# Patient Record
Sex: FEMALE | Race: BLACK OR AFRICAN AMERICAN | NOT HISPANIC OR LATINO | Employment: FULL TIME | URBAN - METROPOLITAN AREA
[De-identification: names, ages, dates, MRNs, and addresses within clinical notes are randomized per-mention and may not be internally consistent; named-entity substitution may affect disease eponyms.]

---

## 2024-04-20 ENCOUNTER — APPOINTMENT (OUTPATIENT)
Dept: RADIOLOGY | Facility: HOSPITAL | Age: 59
End: 2024-04-20
Payer: COMMERCIAL

## 2024-04-20 ENCOUNTER — APPOINTMENT (OUTPATIENT)
Dept: CARDIOLOGY | Facility: HOSPITAL | Age: 59
End: 2024-04-20
Payer: COMMERCIAL

## 2024-04-20 ENCOUNTER — HOSPITAL ENCOUNTER (OUTPATIENT)
Facility: HOSPITAL | Age: 59
Setting detail: OBSERVATION
Discharge: HOME | End: 2024-04-23
Attending: STUDENT IN AN ORGANIZED HEALTH CARE EDUCATION/TRAINING PROGRAM | Admitting: INTERNAL MEDICINE
Payer: COMMERCIAL

## 2024-04-20 DIAGNOSIS — G45.9 TIA (TRANSIENT ISCHEMIC ATTACK): ICD-10-CM

## 2024-04-20 DIAGNOSIS — R55 SYNCOPE, UNSPECIFIED SYNCOPE TYPE: ICD-10-CM

## 2024-04-20 DIAGNOSIS — I63.9 CEREBROVASCULAR ACCIDENT (CVA), UNSPECIFIED MECHANISM (MULTI): ICD-10-CM

## 2024-04-20 DIAGNOSIS — G45.8 OTHER TRANSIENT CEREBRAL ISCHEMIC ATTACKS AND RELATED SYNDROMES: ICD-10-CM

## 2024-04-20 DIAGNOSIS — R53.1 GENERALIZED WEAKNESS: Primary | ICD-10-CM

## 2024-04-20 DIAGNOSIS — N30.00 ACUTE CYSTITIS WITHOUT HEMATURIA: ICD-10-CM

## 2024-04-20 LAB
ALBUMIN SERPL BCP-MCNC: 4 G/DL (ref 3.4–5)
ALP SERPL-CCNC: 58 U/L (ref 33–110)
ALT SERPL W P-5'-P-CCNC: 14 U/L (ref 7–45)
ANION GAP SERPL CALC-SCNC: 14 MMOL/L (ref 10–20)
APPEARANCE UR: ABNORMAL
AST SERPL W P-5'-P-CCNC: 15 U/L (ref 9–39)
BACTERIA #/AREA URNS AUTO: ABNORMAL /HPF
BASOPHILS # BLD AUTO: 0.08 X10*3/UL (ref 0–0.1)
BASOPHILS NFR BLD AUTO: 0.7 %
BILIRUB SERPL-MCNC: 0.3 MG/DL (ref 0–1.2)
BILIRUB UR STRIP.AUTO-MCNC: NEGATIVE MG/DL
BNP SERPL-MCNC: 15 PG/ML (ref 0–99)
BUN SERPL-MCNC: 13 MG/DL (ref 6–23)
CALCIUM SERPL-MCNC: 9.1 MG/DL (ref 8.6–10.3)
CARDIAC TROPONIN I PNL SERPL HS: 3 NG/L (ref 0–13)
CARDIAC TROPONIN I PNL SERPL HS: 3 NG/L (ref 0–13)
CHLORIDE SERPL-SCNC: 103 MMOL/L (ref 98–107)
CK SERPL-CCNC: 473 U/L (ref 0–215)
CO2 SERPL-SCNC: 26 MMOL/L (ref 21–32)
COLOR UR: YELLOW
CREAT SERPL-MCNC: 0.71 MG/DL (ref 0.5–1.05)
EGFRCR SERPLBLD CKD-EPI 2021: >90 ML/MIN/1.73M*2
EOSINOPHIL # BLD AUTO: 0.31 X10*3/UL (ref 0–0.7)
EOSINOPHIL NFR BLD AUTO: 2.6 %
ERYTHROCYTE [DISTWIDTH] IN BLOOD BY AUTOMATED COUNT: 13.6 % (ref 11.5–14.5)
FLUAV RNA RESP QL NAA+PROBE: NOT DETECTED
FLUBV RNA RESP QL NAA+PROBE: NOT DETECTED
GLUCOSE BLD MANUAL STRIP-MCNC: 142 MG/DL (ref 74–99)
GLUCOSE BLD MANUAL STRIP-MCNC: 98 MG/DL (ref 74–99)
GLUCOSE SERPL-MCNC: 151 MG/DL (ref 74–99)
GLUCOSE UR STRIP.AUTO-MCNC: ABNORMAL MG/DL
HCT VFR BLD AUTO: 39.5 % (ref 36–46)
HGB BLD-MCNC: 12.3 G/DL (ref 12–16)
IMM GRANULOCYTES # BLD AUTO: 0.03 X10*3/UL (ref 0–0.7)
IMM GRANULOCYTES NFR BLD AUTO: 0.3 % (ref 0–0.9)
KETONES UR STRIP.AUTO-MCNC: NEGATIVE MG/DL
LACTATE SERPL-SCNC: 1.9 MMOL/L (ref 0.4–2)
LEUKOCYTE ESTERASE UR QL STRIP.AUTO: ABNORMAL
LIPASE SERPL-CCNC: 37 U/L (ref 9–82)
LYMPHOCYTES # BLD AUTO: 5.98 X10*3/UL (ref 1.2–4.8)
LYMPHOCYTES NFR BLD AUTO: 50.4 %
MAGNESIUM SERPL-MCNC: 1.84 MG/DL (ref 1.6–2.4)
MCH RBC QN AUTO: 27.4 PG (ref 26–34)
MCHC RBC AUTO-ENTMCNC: 31.1 G/DL (ref 32–36)
MCV RBC AUTO: 88 FL (ref 80–100)
MONOCYTES # BLD AUTO: 0.75 X10*3/UL (ref 0.1–1)
MONOCYTES NFR BLD AUTO: 6.3 %
MUCOUS THREADS #/AREA URNS AUTO: ABNORMAL /LPF
NEUTROPHILS # BLD AUTO: 4.71 X10*3/UL (ref 1.2–7.7)
NEUTROPHILS NFR BLD AUTO: 39.7 %
NITRITE UR QL STRIP.AUTO: POSITIVE
NRBC BLD-RTO: 0 /100 WBCS (ref 0–0)
PH UR STRIP.AUTO: 5 [PH]
PLATELET # BLD AUTO: 286 X10*3/UL (ref 150–450)
POTASSIUM SERPL-SCNC: 3.5 MMOL/L (ref 3.5–5.3)
PROT SERPL-MCNC: 8 G/DL (ref 6.4–8.2)
PROT UR STRIP.AUTO-MCNC: NEGATIVE MG/DL
RBC # BLD AUTO: 4.49 X10*6/UL (ref 4–5.2)
RBC # UR STRIP.AUTO: ABNORMAL /UL
RBC #/AREA URNS AUTO: ABNORMAL /HPF
RSV RNA RESP QL NAA+PROBE: NOT DETECTED
SARS-COV-2 RNA RESP QL NAA+PROBE: NOT DETECTED
SODIUM SERPL-SCNC: 139 MMOL/L (ref 136–145)
SP GR UR STRIP.AUTO: 1.02
SQUAMOUS #/AREA URNS AUTO: ABNORMAL /HPF
UROBILINOGEN UR STRIP.AUTO-MCNC: <2 MG/DL
WBC # BLD AUTO: 11.9 X10*3/UL (ref 4.4–11.3)
WBC #/AREA URNS AUTO: ABNORMAL /HPF

## 2024-04-20 PROCEDURE — 99285 EMERGENCY DEPT VISIT HI MDM: CPT | Mod: 25

## 2024-04-20 PROCEDURE — G0378 HOSPITAL OBSERVATION PER HR: HCPCS

## 2024-04-20 PROCEDURE — 82947 ASSAY GLUCOSE BLOOD QUANT: CPT | Mod: 59,91

## 2024-04-20 PROCEDURE — 83036 HEMOGLOBIN GLYCOSYLATED A1C: CPT | Performed by: NURSE PRACTITIONER

## 2024-04-20 PROCEDURE — 74177 CT ABD & PELVIS W/CONTRAST: CPT | Performed by: RADIOLOGY

## 2024-04-20 PROCEDURE — 83690 ASSAY OF LIPASE: CPT | Performed by: STUDENT IN AN ORGANIZED HEALTH CARE EDUCATION/TRAINING PROGRAM

## 2024-04-20 PROCEDURE — 36415 COLL VENOUS BLD VENIPUNCTURE: CPT | Performed by: STUDENT IN AN ORGANIZED HEALTH CARE EDUCATION/TRAINING PROGRAM

## 2024-04-20 PROCEDURE — 94660 CPAP INITIATION&MGMT: CPT

## 2024-04-20 PROCEDURE — 83880 ASSAY OF NATRIURETIC PEPTIDE: CPT | Performed by: STUDENT IN AN ORGANIZED HEALTH CARE EDUCATION/TRAINING PROGRAM

## 2024-04-20 PROCEDURE — 96375 TX/PRO/DX INJ NEW DRUG ADDON: CPT

## 2024-04-20 PROCEDURE — 99222 1ST HOSP IP/OBS MODERATE 55: CPT | Performed by: NURSE PRACTITIONER

## 2024-04-20 PROCEDURE — 71045 X-RAY EXAM CHEST 1 VIEW: CPT

## 2024-04-20 PROCEDURE — 87086 URINE CULTURE/COLONY COUNT: CPT | Mod: PARLAB | Performed by: STUDENT IN AN ORGANIZED HEALTH CARE EDUCATION/TRAINING PROGRAM

## 2024-04-20 PROCEDURE — 87637 SARSCOV2&INF A&B&RSV AMP PRB: CPT | Performed by: STUDENT IN AN ORGANIZED HEALTH CARE EDUCATION/TRAINING PROGRAM

## 2024-04-20 PROCEDURE — 71045 X-RAY EXAM CHEST 1 VIEW: CPT | Performed by: RADIOLOGY

## 2024-04-20 PROCEDURE — 74177 CT ABD & PELVIS W/CONTRAST: CPT

## 2024-04-20 PROCEDURE — 82947 ASSAY GLUCOSE BLOOD QUANT: CPT | Mod: 59

## 2024-04-20 PROCEDURE — 83605 ASSAY OF LACTIC ACID: CPT | Performed by: STUDENT IN AN ORGANIZED HEALTH CARE EDUCATION/TRAINING PROGRAM

## 2024-04-20 PROCEDURE — 83735 ASSAY OF MAGNESIUM: CPT | Performed by: STUDENT IN AN ORGANIZED HEALTH CARE EDUCATION/TRAINING PROGRAM

## 2024-04-20 PROCEDURE — 96372 THER/PROPH/DIAG INJ SC/IM: CPT | Performed by: NURSE PRACTITIONER

## 2024-04-20 PROCEDURE — 80053 COMPREHEN METABOLIC PANEL: CPT | Performed by: STUDENT IN AN ORGANIZED HEALTH CARE EDUCATION/TRAINING PROGRAM

## 2024-04-20 PROCEDURE — 85025 COMPLETE CBC W/AUTO DIFF WBC: CPT | Performed by: STUDENT IN AN ORGANIZED HEALTH CARE EDUCATION/TRAINING PROGRAM

## 2024-04-20 PROCEDURE — 2500000004 HC RX 250 GENERAL PHARMACY W/ HCPCS (ALT 636 FOR OP/ED): Performed by: NURSE PRACTITIONER

## 2024-04-20 PROCEDURE — 81001 URINALYSIS AUTO W/SCOPE: CPT | Performed by: STUDENT IN AN ORGANIZED HEALTH CARE EDUCATION/TRAINING PROGRAM

## 2024-04-20 PROCEDURE — 70450 CT HEAD/BRAIN W/O DYE: CPT | Performed by: RADIOLOGY

## 2024-04-20 PROCEDURE — 84484 ASSAY OF TROPONIN QUANT: CPT | Performed by: STUDENT IN AN ORGANIZED HEALTH CARE EDUCATION/TRAINING PROGRAM

## 2024-04-20 PROCEDURE — 96361 HYDRATE IV INFUSION ADD-ON: CPT

## 2024-04-20 PROCEDURE — 93005 ELECTROCARDIOGRAM TRACING: CPT

## 2024-04-20 PROCEDURE — 70450 CT HEAD/BRAIN W/O DYE: CPT

## 2024-04-20 PROCEDURE — 2500000006 HC RX 250 W HCPCS SELF ADMINISTERED DRUGS (ALT 637 FOR ALL PAYERS): Mod: MUE | Performed by: NURSE PRACTITIONER

## 2024-04-20 PROCEDURE — 2500000006 HC RX 250 W HCPCS SELF ADMINISTERED DRUGS (ALT 637 FOR ALL PAYERS): Performed by: NURSE PRACTITIONER

## 2024-04-20 PROCEDURE — 82550 ASSAY OF CK (CPK): CPT | Performed by: STUDENT IN AN ORGANIZED HEALTH CARE EDUCATION/TRAINING PROGRAM

## 2024-04-20 PROCEDURE — 2500000004 HC RX 250 GENERAL PHARMACY W/ HCPCS (ALT 636 FOR OP/ED): Performed by: STUDENT IN AN ORGANIZED HEALTH CARE EDUCATION/TRAINING PROGRAM

## 2024-04-20 PROCEDURE — 2550000001 HC RX 255 CONTRASTS: Performed by: STUDENT IN AN ORGANIZED HEALTH CARE EDUCATION/TRAINING PROGRAM

## 2024-04-20 RX ORDER — ASPIRIN 81 MG/1
81 TABLET ORAL DAILY
Status: DISCONTINUED | OUTPATIENT
Start: 2024-04-21 | End: 2024-04-23 | Stop reason: HOSPADM

## 2024-04-20 RX ORDER — ENOXAPARIN SODIUM 100 MG/ML
40 INJECTION SUBCUTANEOUS EVERY 12 HOURS SCHEDULED
Status: DISCONTINUED | OUTPATIENT
Start: 2024-04-20 | End: 2024-04-23 | Stop reason: HOSPADM

## 2024-04-20 RX ORDER — PROMETHAZINE HYDROCHLORIDE 25 MG/1
25 TABLET ORAL DAILY PRN
COMMUNITY
Start: 2022-08-31

## 2024-04-20 RX ORDER — MELOXICAM 7.5 MG/1
7.5 TABLET ORAL EVERY 6 HOURS PRN
Status: DISCONTINUED | OUTPATIENT
Start: 2024-04-20 | End: 2024-04-23 | Stop reason: HOSPADM

## 2024-04-20 RX ORDER — LIDOCAINE 40 MG/G
1 CREAM TOPICAL 3 TIMES DAILY PRN
Status: DISCONTINUED | OUTPATIENT
Start: 2024-04-20 | End: 2024-04-23 | Stop reason: HOSPADM

## 2024-04-20 RX ORDER — DICLOFENAC SODIUM 10 MG/G
1 GEL TOPICAL 4 TIMES DAILY PRN
COMMUNITY
Start: 2023-04-07

## 2024-04-20 RX ORDER — DIPHENHYDRAMINE HCL 25 MG
25 TABLET ORAL NIGHTLY PRN
COMMUNITY
Start: 2024-03-25

## 2024-04-20 RX ORDER — DEXTROSE 50 % IN WATER (D50W) INTRAVENOUS SYRINGE
25
Status: DISCONTINUED | OUTPATIENT
Start: 2024-04-20 | End: 2024-04-23 | Stop reason: HOSPADM

## 2024-04-20 RX ORDER — INSULIN GLARGINE 300 [IU]/ML
25 INJECTION, SOLUTION SUBCUTANEOUS EVERY EVENING
COMMUNITY
Start: 2023-12-12

## 2024-04-20 RX ORDER — FLUTICASONE FUROATE AND VILANTEROL 200; 25 UG/1; UG/1
1 POWDER RESPIRATORY (INHALATION) EVERY OTHER DAY
Status: DISCONTINUED | OUTPATIENT
Start: 2024-04-21 | End: 2024-04-23 | Stop reason: HOSPADM

## 2024-04-20 RX ORDER — ACETAMINOPHEN 325 MG/1
975 TABLET ORAL EVERY 8 HOURS
Status: DISCONTINUED | OUTPATIENT
Start: 2024-04-20 | End: 2024-04-23 | Stop reason: HOSPADM

## 2024-04-20 RX ORDER — ROSUVASTATIN CALCIUM 10 MG/1
20 TABLET, COATED ORAL NIGHTLY
Status: DISCONTINUED | OUTPATIENT
Start: 2024-04-20 | End: 2024-04-23 | Stop reason: HOSPADM

## 2024-04-20 RX ORDER — POLYETHYLENE GLYCOL 3350 17 G/17G
17 POWDER, FOR SOLUTION ORAL 2 TIMES DAILY PRN
Status: DISCONTINUED | OUTPATIENT
Start: 2024-04-20 | End: 2024-04-23 | Stop reason: HOSPADM

## 2024-04-20 RX ORDER — METOPROLOL SUCCINATE 50 MG/1
50 TABLET, EXTENDED RELEASE ORAL DAILY
COMMUNITY
Start: 2022-08-31

## 2024-04-20 RX ORDER — LABETALOL HYDROCHLORIDE 5 MG/ML
10 INJECTION, SOLUTION INTRAVENOUS EVERY 10 MIN PRN
Status: ACTIVE | OUTPATIENT
Start: 2024-04-20 | End: 2024-04-22

## 2024-04-20 RX ORDER — HYDRALAZINE HYDROCHLORIDE 25 MG/1
25 TABLET, FILM COATED ORAL EVERY 6 HOURS PRN
Status: DISCONTINUED | OUTPATIENT
Start: 2024-04-22 | End: 2024-04-23 | Stop reason: HOSPADM

## 2024-04-20 RX ORDER — INSULIN GLARGINE 100 [IU]/ML
20 INJECTION, SOLUTION SUBCUTANEOUS EVERY 24 HOURS
Status: DISCONTINUED | OUTPATIENT
Start: 2024-04-20 | End: 2024-04-23 | Stop reason: HOSPADM

## 2024-04-20 RX ORDER — MELOXICAM 7.5 MG/1
7.5 TABLET ORAL EVERY 6 HOURS PRN
COMMUNITY
Start: 2022-08-31

## 2024-04-20 RX ORDER — ALBUTEROL SULFATE 90 UG/1
2 AEROSOL, METERED RESPIRATORY (INHALATION) EVERY 4 HOURS PRN
COMMUNITY
Start: 2024-01-29

## 2024-04-20 RX ORDER — LORAZEPAM 2 MG/ML
2 INJECTION INTRAMUSCULAR ONCE AS NEEDED
Status: DISCONTINUED | OUTPATIENT
Start: 2024-04-20 | End: 2024-04-23 | Stop reason: HOSPADM

## 2024-04-20 RX ORDER — LIDOCAINE 40 MG/G
1 CREAM TOPICAL DAILY PRN
COMMUNITY

## 2024-04-20 RX ORDER — ASPIRIN 325 MG
50000 TABLET, DELAYED RELEASE (ENTERIC COATED) ORAL
COMMUNITY
Start: 2023-07-19

## 2024-04-20 RX ORDER — DEXTROSE 50 % IN WATER (D50W) INTRAVENOUS SYRINGE
12.5
Status: DISCONTINUED | OUTPATIENT
Start: 2024-04-20 | End: 2024-04-23 | Stop reason: HOSPADM

## 2024-04-20 RX ORDER — DEXTROMETHORPHAN HYDROBROMIDE, GUAIFENESIN 5; 100 MG/5ML; MG/5ML
1300 LIQUID ORAL EVERY 8 HOURS PRN
COMMUNITY
Start: 2024-03-27

## 2024-04-20 RX ORDER — CYCLOBENZAPRINE HCL 10 MG
10 TABLET ORAL EVERY 12 HOURS PRN
Status: DISCONTINUED | OUTPATIENT
Start: 2024-04-20 | End: 2024-04-23 | Stop reason: HOSPADM

## 2024-04-20 RX ORDER — FLUTICASONE FUROATE AND VILANTEROL 200; 25 UG/1; UG/1
1 POWDER RESPIRATORY (INHALATION) EVERY OTHER DAY
COMMUNITY
Start: 2023-11-28

## 2024-04-20 RX ORDER — ROSUVASTATIN CALCIUM 20 MG/1
20 TABLET, COATED ORAL NIGHTLY
COMMUNITY
Start: 2024-03-25

## 2024-04-20 RX ORDER — DICLOFENAC SODIUM 10 MG/G
4 GEL TOPICAL 4 TIMES DAILY PRN
Status: DISCONTINUED | OUTPATIENT
Start: 2024-04-20 | End: 2024-04-23 | Stop reason: HOSPADM

## 2024-04-20 RX ORDER — LISINOPRIL 10 MG/1
40 TABLET ORAL DAILY
Status: CANCELLED | OUTPATIENT
Start: 2024-04-20

## 2024-04-20 RX ORDER — ALBUTEROL SULFATE 0.83 MG/ML
2.5 SOLUTION RESPIRATORY (INHALATION) EVERY 4 HOURS PRN
Status: DISCONTINUED | OUTPATIENT
Start: 2024-04-20 | End: 2024-04-21

## 2024-04-20 RX ORDER — HYDRALAZINE HYDROCHLORIDE 20 MG/ML
10 INJECTION INTRAMUSCULAR; INTRAVENOUS
Status: ACTIVE | OUTPATIENT
Start: 2024-04-20 | End: 2024-04-22

## 2024-04-20 RX ORDER — MORPHINE SULFATE 4 MG/ML
4 INJECTION, SOLUTION INTRAMUSCULAR; INTRAVENOUS ONCE
Status: COMPLETED | OUTPATIENT
Start: 2024-04-20 | End: 2024-04-20

## 2024-04-20 RX ORDER — TRIAMTERENE AND HYDROCHLOROTHIAZIDE 37.5; 25 MG/1; MG/1
1 CAPSULE ORAL EVERY MORNING
COMMUNITY
Start: 2023-07-25 | End: 2024-04-23 | Stop reason: HOSPADM

## 2024-04-20 RX ORDER — METOPROLOL SUCCINATE 50 MG/1
50 TABLET, EXTENDED RELEASE ORAL DAILY
Status: DISCONTINUED | OUTPATIENT
Start: 2024-04-21 | End: 2024-04-23 | Stop reason: HOSPADM

## 2024-04-20 RX ORDER — TRIAMTERENE/HYDROCHLOROTHIAZID 37.5-25 MG
1 TABLET ORAL DAILY
Status: CANCELLED | OUTPATIENT
Start: 2024-04-20

## 2024-04-20 RX ORDER — ALPHA LIPOIC ACID 100 MG
100 CAPSULE ORAL DAILY
COMMUNITY

## 2024-04-20 RX ORDER — LISINOPRIL 40 MG/1
40 TABLET ORAL DAILY
Status: DISCONTINUED | OUTPATIENT
Start: 2024-04-21 | End: 2024-04-23 | Stop reason: HOSPADM

## 2024-04-20 RX ORDER — SEMAGLUTIDE 1.34 MG/ML
1 INJECTION, SOLUTION SUBCUTANEOUS
COMMUNITY
Start: 2023-12-01

## 2024-04-20 RX ORDER — DIPHENHYDRAMINE HCL 25 MG
25 CAPSULE ORAL NIGHTLY PRN
Status: DISCONTINUED | OUTPATIENT
Start: 2024-04-20 | End: 2024-04-23 | Stop reason: HOSPADM

## 2024-04-20 RX ORDER — TRIAMTERENE/HYDROCHLOROTHIAZID 37.5-25 MG
1 TABLET ORAL DAILY
Status: DISCONTINUED | OUTPATIENT
Start: 2024-04-21 | End: 2024-04-23 | Stop reason: HOSPADM

## 2024-04-20 RX ORDER — INSULIN LISPRO 100 [IU]/ML
0-10 INJECTION, SOLUTION INTRAVENOUS; SUBCUTANEOUS
Status: DISCONTINUED | OUTPATIENT
Start: 2024-04-21 | End: 2024-04-23 | Stop reason: HOSPADM

## 2024-04-20 RX ORDER — CYCLOBENZAPRINE HCL 10 MG
10 TABLET ORAL EVERY 12 HOURS PRN
COMMUNITY
Start: 2023-10-20

## 2024-04-20 RX ORDER — LISINOPRIL 40 MG/1
40 TABLET ORAL DAILY
COMMUNITY
Start: 2023-09-12 | End: 2024-09-11

## 2024-04-20 RX ADMIN — MORPHINE SULFATE 4 MG: 4 INJECTION, SOLUTION INTRAMUSCULAR; INTRAVENOUS at 18:10

## 2024-04-20 RX ADMIN — ACETAMINOPHEN 975 MG: 325 TABLET ORAL at 22:29

## 2024-04-20 RX ADMIN — ROSUVASTATIN CALCIUM 20 MG: 10 TABLET, FILM COATED ORAL at 22:29

## 2024-04-20 RX ADMIN — IOHEXOL 90 ML: 350 INJECTION, SOLUTION INTRAVENOUS at 21:06

## 2024-04-20 RX ADMIN — SODIUM CHLORIDE, POTASSIUM CHLORIDE, SODIUM LACTATE AND CALCIUM CHLORIDE 1000 ML: 600; 310; 30; 20 INJECTION, SOLUTION INTRAVENOUS at 16:58

## 2024-04-20 RX ADMIN — ENOXAPARIN SODIUM 40 MG: 40 INJECTION SUBCUTANEOUS at 22:29

## 2024-04-20 SDOH — ECONOMIC STABILITY: HOUSING INSECURITY: DO YOU FEEL UNSAFE GOING BACK TO THE PLACE WHERE YOU LIVE?: NO

## 2024-04-20 SDOH — SOCIAL STABILITY: SOCIAL INSECURITY: HAVE YOU HAD ANY THOUGHTS OF HARMING ANYONE ELSE?: NO

## 2024-04-20 SDOH — SOCIAL STABILITY: SOCIAL INSECURITY: HAVE YOU HAD THOUGHTS OF HARMING ANYONE ELSE?: NO

## 2024-04-20 SDOH — SOCIAL STABILITY: SOCIAL INSECURITY: HAS ANYONE EVER THREATENED TO HURT YOUR FAMILY OR YOUR PETS?: NO

## 2024-04-20 SDOH — SOCIAL STABILITY: SOCIAL INSECURITY: DOES ANYONE TRY TO KEEP YOU FROM HAVING/CONTACTING OTHER FRIENDS OR DOING THINGS OUTSIDE YOUR HOME?: NO

## 2024-04-20 SDOH — SOCIAL STABILITY: SOCIAL INSECURITY: DO YOU FEEL UNSAFE GOING BACK TO THE PLACE WHERE YOU ARE LIVING?: NO

## 2024-04-20 SDOH — SOCIAL STABILITY: SOCIAL INSECURITY: ARE THERE ANY APPARENT SIGNS OF INJURIES/BEHAVIORS THAT COULD BE RELATED TO ABUSE/NEGLECT?: NO

## 2024-04-20 SDOH — SOCIAL STABILITY: SOCIAL INSECURITY: DO YOU FEEL ANYONE HAS EXPLOITED OR TAKEN ADVANTAGE OF YOU FINANCIALLY OR OF YOUR PERSONAL PROPERTY?: NO

## 2024-04-20 SDOH — SOCIAL STABILITY: SOCIAL INSECURITY: ARE YOU OR HAVE YOU BEEN THREATENED OR ABUSED PHYSICALLY, EMOTIONALLY, OR SEXUALLY BY ANYONE?: NO

## 2024-04-20 SDOH — SOCIAL STABILITY: SOCIAL INSECURITY: ABUSE: ADULT

## 2024-04-20 ASSESSMENT — PAIN SCALES - GENERAL
PAINLEVEL_OUTOF10: 5 - MODERATE PAIN
PAINLEVEL_OUTOF10: 5 - MODERATE PAIN
PAINLEVEL_OUTOF10: 0 - NO PAIN
PAINLEVEL_OUTOF10: 7
PAINLEVEL_OUTOF10: 5 - MODERATE PAIN

## 2024-04-20 ASSESSMENT — COGNITIVE AND FUNCTIONAL STATUS - GENERAL
DAILY ACTIVITIY SCORE: 24
WALKING IN HOSPITAL ROOM: TOTAL
STANDING UP FROM CHAIR USING ARMS: A LOT
CLIMB 3 TO 5 STEPS WITH RAILING: TOTAL
DAILY ACTIVITIY SCORE: 24
MOBILITY SCORE: 14
MOBILITY SCORE: 14
MOVING TO AND FROM BED TO CHAIR: A LOT
WALKING IN HOSPITAL ROOM: TOTAL
PATIENT BASELINE BEDBOUND: UNABLE TO ASSESS AT THIS TIME
STANDING UP FROM CHAIR USING ARMS: A LOT
CLIMB 3 TO 5 STEPS WITH RAILING: TOTAL
MOVING TO AND FROM BED TO CHAIR: A LOT

## 2024-04-20 ASSESSMENT — LIFESTYLE VARIABLES
TOTAL SCORE: 0
SKIP TO QUESTIONS 9-10: 1
AUDIT-C TOTAL SCORE: 0
EVER HAD A DRINK FIRST THING IN THE MORNING TO STEADY YOUR NERVES TO GET RID OF A HANGOVER: NO
EVER FELT BAD OR GUILTY ABOUT YOUR DRINKING: NO
SUBSTANCE_ABUSE_PAST_12_MONTHS: NO
HOW OFTEN DO YOU HAVE 6 OR MORE DRINKS ON ONE OCCASION: NEVER
HAVE YOU EVER FELT YOU SHOULD CUT DOWN ON YOUR DRINKING: NO
AUDIT-C TOTAL SCORE: 0
HOW MANY STANDARD DRINKS CONTAINING ALCOHOL DO YOU HAVE ON A TYPICAL DAY: PATIENT DOES NOT DRINK
HOW OFTEN DO YOU HAVE A DRINK CONTAINING ALCOHOL: NEVER
HAVE PEOPLE ANNOYED YOU BY CRITICIZING YOUR DRINKING: NO

## 2024-04-20 ASSESSMENT — PAIN DESCRIPTION - PROGRESSION
CLINICAL_PROGRESSION: GRADUALLY WORSENING
CLINICAL_PROGRESSION: GRADUALLY IMPROVING
CLINICAL_PROGRESSION: NOT CHANGED

## 2024-04-20 ASSESSMENT — ACTIVITIES OF DAILY LIVING (ADL)
WALKS IN HOME: DEPENDENT
GROOMING: INDEPENDENT
ASSISTIVE_DEVICE: WHEELCHAIR
HEARING - RIGHT EAR: FUNCTIONAL
LACK_OF_TRANSPORTATION: PATIENT DECLINED
TOILETING: NEEDS ASSISTANCE
JUDGMENT_ADEQUATE_SAFELY_COMPLETE_DAILY_ACTIVITIES: YES
PATIENT'S MEMORY ADEQUATE TO SAFELY COMPLETE DAILY ACTIVITIES?: YES
BATHING: INDEPENDENT
DRESSING YOURSELF: INDEPENDENT
HEARING - LEFT EAR: FUNCTIONAL
FEEDING YOURSELF: INDEPENDENT
ADEQUATE_TO_COMPLETE_ADL: YES

## 2024-04-20 ASSESSMENT — PAIN DESCRIPTION - LOCATION
LOCATION: GENERALIZED

## 2024-04-20 ASSESSMENT — PATIENT HEALTH QUESTIONNAIRE - PHQ9
1. LITTLE INTEREST OR PLEASURE IN DOING THINGS: NOT AT ALL
SUM OF ALL RESPONSES TO PHQ9 QUESTIONS 1 & 2: 0
2. FEELING DOWN, DEPRESSED OR HOPELESS: NOT AT ALL

## 2024-04-20 ASSESSMENT — COLUMBIA-SUICIDE SEVERITY RATING SCALE - C-SSRS
1. SINCE LAST CONTACT, HAVE YOU WISHED YOU WERE DEAD OR WISHED YOU COULD GO TO SLEEP AND NOT WAKE UP?: NO
6. HAVE YOU EVER DONE ANYTHING, STARTED TO DO ANYTHING, OR PREPARED TO DO ANYTHING TO END YOUR LIFE?: NO
2. HAVE YOU ACTUALLY HAD ANY THOUGHTS OF KILLING YOURSELF?: NO

## 2024-04-20 ASSESSMENT — PAIN DESCRIPTION - PAIN TYPE
TYPE: CHRONIC PAIN

## 2024-04-20 ASSESSMENT — PAIN - FUNCTIONAL ASSESSMENT
PAIN_FUNCTIONAL_ASSESSMENT: 0-10
PAIN_FUNCTIONAL_ASSESSMENT: 0-10

## 2024-04-20 NOTE — ED PROVIDER NOTES
HPI   No chief complaint on file.      HPI     Patient is a 59-year-old female presenting to the emergency department today in the setting of generalized weakness.  This happened while she was seated making shoes for her grandchildren at her daughter's house.  She states that she was doing this and then was overcome by a sudden bout of weakness.  Reportedly she did have a bowel movement at that time.  She did not lose tone or fall out of her chair or hit her head.  No recent trauma.  She states she is otherwise been at her baseline state of health.  She has generalized pain at baseline given a history of fibromyalgia which is unchanged per patient.  She also has polymyositis and states she struggles with pain and some generalized weakness in the setting of this history.  Denies any known sick contacts.  No nausea, vomiting.  No new pain and specifically denies any chest pain pressure-like chest discomfort.  Denies any headache, vision changes.               No data recorded                   Patient History   No past medical history on file.  No past surgical history on file.  No family history on file.  Social History     Tobacco Use    Smoking status: Not on file    Smokeless tobacco: Not on file   Substance Use Topics    Alcohol use: Not on file    Drug use: Not on file       Physical Exam   ED Triage Vitals   Temp Pulse Resp BP   -- -- -- --      SpO2 Temp src Heart Rate Source Patient Position   -- -- -- --      BP Location FiO2 (%)     -- --       Physical Exam  Vitals and nursing note reviewed.   Constitutional:       General: She is not in acute distress.     Appearance: She is well-developed.      Comments: Tearful, noting pain throughout body   HENT:      Head: Normocephalic and atraumatic.      Mouth/Throat:      Mouth: Mucous membranes are moist.   Eyes:      Conjunctiva/sclera: Conjunctivae normal.   Neck:      Meningeal: Brudzinski's sign and Kernig's sign absent.   Cardiovascular:      Rate and  Rhythm: Normal rate and regular rhythm.      Heart sounds: No murmur heard.  Pulmonary:      Effort: Pulmonary effort is normal. No respiratory distress.      Breath sounds: Normal breath sounds.   Abdominal:      Palpations: Abdomen is soft.      Tenderness: There is no abdominal tenderness.   Musculoskeletal:         General: Tenderness present. No swelling.      Cervical back: Neck supple. No rigidity.      Right lower leg: No edema.      Left lower leg: No edema.      Comments: Tenderness on palpation primarily of proximal muscles throughout including proximal thighs and upper arms, no swelling or induration/fluctuance   Skin:     General: Skin is warm and dry.      Capillary Refill: Capillary refill takes less than 2 seconds.      Findings: No erythema.   Neurological:      Mental Status: She is alert, oriented to person, place, and time and easily aroused.      Cranial Nerves: No cranial nerve deficit.      Sensory: Sensory deficit present.      Motor: Weakness present.      Comments: Left lower leg paresthesias, endorsed weakness throughout bilateral upper and lower extremities without focality   Psychiatric:         Mood and Affect: Mood normal.         ED Course & [facility]   ED Course as of 04/22/24 1624   Sat Apr 20, 2024   1626 EKG: rate 83, , QRS 88, Qtc 451. No STEMI. Impression: sinus rhythm [AH]   1652 CBC and Auto Differential(!)  Reviewed, mild leukocytosis [AH]   1700 Lactate  wnl [AH]   1722 Creatine Kinase(!)  Elevated CK, getting IV fluids [AH]   1722 Troponin I Series, High Sensitivity (0, 1 HR)  negative [AH]   1722 B-Type Natriuretic Peptide  negative [AH]   1722 Lipase  wnl [AH]   1722 Magnesium  wnl [AH]   1745 Patient re-examined at bedside, seen with family. Repeat full neuro evaluation performed. Of note patient endorses pain and some tingling in the LLE and bilateral hands. She has more pain as prominent symptom and given bilateral aspect less concerning for CVA. Did document NIHSS and  had discussion with family. Patient is tearful and in pain on bedside and discussed further workup. At this time discussed TNK; however, relative contraindication given low NIHSS as well as low suspicion for CVA given pain as prominent symptom. Patient herself also endorses a history of neuropathy as compenant. Discussed need to bring patient in for MRI and further evaluation.  []   1822 Troponin I Series, High Sensitivity (0, 1 HR)  2nd trop negative []      ED Course User Index  [] Diane Oviedo MD         Diagnoses as of 04/22/24 1624   Generalized weakness       Medical Decision Making  Patient is a 59-year-old female presenting to the emergency department in the setting of generalized weakness.  She has no focal findings.  She endorses being overcome by a feeling of significant fatigue.  She appears fatigued at bedside.  She is able to engage in a conversation however and move all extremities.  She has pain in the proximal muscles consistent with her history of polymyositis.  Will get a CK for evaluation of any flare of symptoms.  Will plan for broad workup given presenting symptoms.  Given no chest pain low suspicion for ACS however given age and possible atypical presentation we will get cardiac enzymes.  EKG nonischemic.  Will get a CT head given her symptoms of generalized weakness though low suspicion for acute intracranial bleed given lack of headache or head trauma.  Also do an infectious workup given her nonspecific symptoms including urinalysis, chest x-ray blood work including CBC and will evaluate for any electrolyte derangement that may be precipitating some of her symptoms.    Workup reviewed, patient more alert and corresponsive on repeat examination. CK mildly up, given proximal muscle pain and generalized, non focal weakness possible myositis flare. Her exam remains inconsistent with a central CVA and as discussed she is able to anti-gravy and maintain strength on raise in all extremities  though endorses feeling weak and pain when doing so. After morphine improved on bedside. Lack of focality or identifiable pattern of symptoms, will need further workup. Per discussion with family possible syncope before this vs brief seizure. Would benefit from neurology c/s, echo, MRI and further testing and monitoring. Discussed need for MRI imaging and admission, patient and family in agreement with this plan.     Procedure  Procedures     Diane Oviedo MD  04/22/24 6098

## 2024-04-20 NOTE — ED TRIAGE NOTES
PT BIBA FROM HOME FOR GENERALIZED WEAKNESS. PT WAS SITTING ON CHAIR AT HOME, MAKING SHOES WHEN SHE BECAME SUDDENLY WEAK AND FATIGUE. PT FAMILY STATES PT WAS LETHARGIC AND SLOW TO RESPOND. ON EMS ARRIVAL, PT HAD BOWEL MOVEMENT. DENIES RECENT INJURY/ TRAUMA, CHEST PAIN SOB, FEVERS/ CHILLS, N/V/D. PT H(X) FIBROMYALGIA AND DIABETES.

## 2024-04-20 NOTE — PROGRESS NOTES
Pharmacy Medication History Review    Liyah Márquez is a 59 y.o. female admitted for Weakness generalized. Pharmacy reviewed the patient's aluxn-[id number] medications and allergies for accuracy.    The list below reflectives the updated PTA list. Please review each medication in order reconciliation for additional clarification and justification.  Prior to Admission medications    Medication Sig Start Date End Date Taking? Authorizing Provider   acetaminophen (Tylenol 8 HOUR) 650 mg ER tablet Take 2 tablets (1,300 mg) by mouth every 8 hours if needed for mild pain (1 - 3). 3/27/24  Yes Historical Provider, MD   albuterol 90 mcg/actuation inhaler Inhale 2 puffs every 4 hours if needed for shortness of breath or wheezing. 1/29/24  Yes Historical Provider, MD   alpha lipoic acid 100 mg capsule Take 100 mg by mouth once daily.   Yes Historical Provider, MD   ASHWAGANDHA EXTRACT ORAL Take 1 capsule by mouth once daily.   Yes Historical Provider, MD   cholecalciferol (Vitamin D-3) 50,000 unit capsule Take 1 capsule (50,000 Units) by mouth 1 (one) time per week. Every Monday 7/19/23  Yes Historical Provider, MD   cyclobenzaprine (Flexeril) 10 mg tablet Take 1 tablet (10 mg) by mouth every 12 hours if needed for muscle spasms. 10/20/23  Yes Historical Provider, MD   diclofenac sodium (Voltaren) 1 % gel Apply 4.5 inches (1 Application) topically 4 times a day as needed. 4/7/23  Yes Historical Provider, MD   diphenhydrAMINE (Banophen) 25 mg tablet Take 1 tablet (25 mg) by mouth as needed at bedtime. 3/25/24  Yes Historical Provider, MD   empagliflozin (Jardiance) 25 mg Take 1 tablet (25 mg) by mouth once daily with breakfast. 8/28/23  Yes Historical Provider, MD   flaxseed oiL oil Take 1 capsule by mouth once daily.   Yes Historical Provider, MD   fluticasone furoate-vilanteroL (Breo Ellipta) 200-25 mcg/dose inhaler Inhale 1 puff every other day. 11/28/23  Yes Historical Provider, MD   insulin glargine (Toujeo Max U-300  SoloStar) 300 unit/mL (3 mL) injection Inject 25 Units under the skin once daily in the evening. 12/12/23  Yes Historical Provider, MD   lidocaine 4 % cream Apply 0.1 g topically once daily as needed.   Yes Historical Provider, MD   lisinopril 40 mg tablet Take 1 tablet (40 mg) by mouth once daily. 9/12/23 9/11/24 Yes Historical Provider, MD   meloxicam (Mobic) 7.5 mg tablet Take 1 tablet (7.5 mg) by mouth every 6 hours if needed. 8/31/22  Yes Historical Provider, MD   metoprolol succinate XL (Toprol-XL) 50 mg 24 hr tablet Take 1 tablet (50 mg) by mouth once daily. 8/31/22  Yes Historical Provider, MD   promethazine (Phenergan) 25 mg tablet Take 1 tablet (25 mg) by mouth once daily as needed for nausea or vomiting. 8/31/22  Yes Historical Provider, MD   rosuvastatin (Crestor) 20 mg tablet Take 1 tablet (20 mg) by mouth once daily at bedtime. 3/25/24  Yes Historical Provider, MD   semaglutide (Ozempic) 1 mg/dose (4 mg/3 mL) pen injector Inject 1 mg under the skin 1 (one) time per week. Every Monday 12/1/23  Yes Historical Provider, MD   triamterene-hydrochlorothiazid (Dyazide) 37.5-25 mg capsule Take 1 capsule by mouth once daily in the morning. 7/25/23 7/24/24 Yes Historical Provider, MD   ubrogepant (Ubrelvy) 100 mg tablet tablet Take 0.5 tablets (50 mg) by mouth 1 time if needed (migraines). 11/10/23 5/12/24 Yes Historical Provider, MD        The list below reflectives the updated allergy list. Please review each documented allergy for additional clarification and justification.  Allergies  Reviewed by Ragini Orozco RN on 4/20/2024        Severity Reactions Comments    Haptens - Plastic And Glue Series High Anaphylaxis     Adhesive Tape-silicones Not Specified Other BLISTERING OF SKIN   All surgical glues    Carrot Not Specified Itching     Denture Care Products Not Specified Other Fixadent- mouth swells and rash    Tizanidine Not Specified Hallucinations headaches, nightmares, sweats & hot flashes,  visual illusions (dark shadows while sleeping)    Aspartame Low Headache Causing migraines    Latex Low Rash blisters ITCHING    Nickel Low Rash     Sucralose Low Headache Causes migraines            Below are additional concerns with the patient's PTA list.      Tona Marina

## 2024-04-21 LAB
ANION GAP SERPL CALC-SCNC: 10 MMOL/L (ref 10–20)
BASOPHILS # BLD AUTO: 0.04 X10*3/UL (ref 0–0.1)
BASOPHILS NFR BLD AUTO: 0.5 %
BUN SERPL-MCNC: 14 MG/DL (ref 6–23)
C COLI+JEJ+UPSA DNA STL QL NAA+PROBE: NOT DETECTED
C DIF TOX TCDA+TCDB STL QL NAA+PROBE: NOT DETECTED
CALCIUM SERPL-MCNC: 8.7 MG/DL (ref 8.6–10.3)
CHLORIDE SERPL-SCNC: 108 MMOL/L (ref 98–107)
CHOLEST SERPL-MCNC: 159 MG/DL (ref 0–199)
CHOLESTEROL/HDL RATIO: 3.9
CO2 SERPL-SCNC: 26 MMOL/L (ref 21–32)
CREAT SERPL-MCNC: 0.63 MG/DL (ref 0.5–1.05)
EC STX1 GENE STL QL NAA+PROBE: NOT DETECTED
EC STX2 GENE STL QL NAA+PROBE: NOT DETECTED
EGFRCR SERPLBLD CKD-EPI 2021: >90 ML/MIN/1.73M*2
EOSINOPHIL # BLD AUTO: 0.17 X10*3/UL (ref 0–0.7)
EOSINOPHIL NFR BLD AUTO: 1.9 %
ERYTHROCYTE [DISTWIDTH] IN BLOOD BY AUTOMATED COUNT: 14 % (ref 11.5–14.5)
EST. AVERAGE GLUCOSE BLD GHB EST-MCNC: 120 MG/DL
GLUCOSE BLD MANUAL STRIP-MCNC: 103 MG/DL (ref 74–99)
GLUCOSE BLD MANUAL STRIP-MCNC: 112 MG/DL (ref 74–99)
GLUCOSE BLD MANUAL STRIP-MCNC: 121 MG/DL (ref 74–99)
GLUCOSE BLD MANUAL STRIP-MCNC: 152 MG/DL (ref 74–99)
GLUCOSE SERPL-MCNC: 105 MG/DL (ref 74–99)
HBA1C MFR BLD: 5.8 %
HCT VFR BLD AUTO: 35.2 % (ref 36–46)
HDLC SERPL-MCNC: 40.6 MG/DL
HGB BLD-MCNC: 10.7 G/DL (ref 12–16)
IMM GRANULOCYTES # BLD AUTO: 0.03 X10*3/UL (ref 0–0.7)
IMM GRANULOCYTES NFR BLD AUTO: 0.3 % (ref 0–0.9)
INR PPP: 1 (ref 0.9–1.1)
LDLC SERPL CALC-MCNC: 89 MG/DL
LYMPHOCYTES # BLD AUTO: 3.15 X10*3/UL (ref 1.2–4.8)
LYMPHOCYTES NFR BLD AUTO: 36.1 %
MCH RBC QN AUTO: 27.2 PG (ref 26–34)
MCHC RBC AUTO-ENTMCNC: 30.4 G/DL (ref 32–36)
MCV RBC AUTO: 89 FL (ref 80–100)
MONOCYTES # BLD AUTO: 0.73 X10*3/UL (ref 0.1–1)
MONOCYTES NFR BLD AUTO: 8.4 %
NEUTROPHILS # BLD AUTO: 4.61 X10*3/UL (ref 1.2–7.7)
NEUTROPHILS NFR BLD AUTO: 52.8 %
NON HDL CHOLESTEROL: 118 MG/DL (ref 0–149)
NOROVIRUS GI + GII RNA STL NAA+PROBE: NOT DETECTED
NRBC BLD-RTO: 0 /100 WBCS (ref 0–0)
PLATELET # BLD AUTO: 281 X10*3/UL (ref 150–450)
POTASSIUM SERPL-SCNC: 3.6 MMOL/L (ref 3.5–5.3)
PROTHROMBIN TIME: 11.7 SECONDS (ref 9.8–12.8)
RBC # BLD AUTO: 3.94 X10*6/UL (ref 4–5.2)
RV RNA STL NAA+PROBE: NOT DETECTED
SALMONELLA DNA STL QL NAA+PROBE: NOT DETECTED
SHIGELLA DNA SPEC QL NAA+PROBE: NOT DETECTED
SODIUM SERPL-SCNC: 140 MMOL/L (ref 136–145)
TRIGL SERPL-MCNC: 146 MG/DL (ref 0–149)
V CHOLERAE DNA STL QL NAA+PROBE: NOT DETECTED
VLDL: 29 MG/DL (ref 0–40)
WBC # BLD AUTO: 8.7 X10*3/UL (ref 4.4–11.3)
Y ENTEROCOL DNA STL QL NAA+PROBE: NOT DETECTED

## 2024-04-21 PROCEDURE — 97162 PT EVAL MOD COMPLEX 30 MIN: CPT | Mod: GP

## 2024-04-21 PROCEDURE — 2500000001 HC RX 250 WO HCPCS SELF ADMINISTERED DRUGS (ALT 637 FOR MEDICARE OP): Performed by: NURSE PRACTITIONER

## 2024-04-21 PROCEDURE — 2500000004 HC RX 250 GENERAL PHARMACY W/ HCPCS (ALT 636 FOR OP/ED): Performed by: NURSE PRACTITIONER

## 2024-04-21 PROCEDURE — 87506 IADNA-DNA/RNA PROBE TQ 6-11: CPT | Mod: PARLAB | Performed by: NURSE PRACTITIONER

## 2024-04-21 PROCEDURE — 85610 PROTHROMBIN TIME: CPT | Performed by: NURSE PRACTITIONER

## 2024-04-21 PROCEDURE — 99222 1ST HOSP IP/OBS MODERATE 55: CPT | Performed by: INTERNAL MEDICINE

## 2024-04-21 PROCEDURE — 99223 1ST HOSP IP/OBS HIGH 75: CPT | Performed by: PSYCHIATRY & NEUROLOGY

## 2024-04-21 PROCEDURE — 94640 AIRWAY INHALATION TREATMENT: CPT

## 2024-04-21 PROCEDURE — 2500000006 HC RX 250 W HCPCS SELF ADMINISTERED DRUGS (ALT 637 FOR ALL PAYERS): Performed by: NURSE PRACTITIONER

## 2024-04-21 PROCEDURE — 97166 OT EVAL MOD COMPLEX 45 MIN: CPT | Mod: GO

## 2024-04-21 PROCEDURE — 36415 COLL VENOUS BLD VENIPUNCTURE: CPT | Performed by: NURSE PRACTITIONER

## 2024-04-21 PROCEDURE — 96372 THER/PROPH/DIAG INJ SC/IM: CPT | Performed by: NURSE PRACTITIONER

## 2024-04-21 PROCEDURE — 94660 CPAP INITIATION&MGMT: CPT

## 2024-04-21 PROCEDURE — 2500000006 HC RX 250 W HCPCS SELF ADMINISTERED DRUGS (ALT 637 FOR ALL PAYERS): Mod: MUE | Performed by: NURSE PRACTITIONER

## 2024-04-21 PROCEDURE — G0378 HOSPITAL OBSERVATION PER HR: HCPCS

## 2024-04-21 PROCEDURE — 2500000002 HC RX 250 W HCPCS SELF ADMINISTERED DRUGS (ALT 637 FOR MEDICARE OP, ALT 636 FOR OP/ED): Performed by: NURSE PRACTITIONER

## 2024-04-21 PROCEDURE — 87493 C DIFF AMPLIFIED PROBE: CPT | Mod: 59,PARLAB | Performed by: NURSE PRACTITIONER

## 2024-04-21 PROCEDURE — 82947 ASSAY GLUCOSE BLOOD QUANT: CPT | Mod: 59

## 2024-04-21 PROCEDURE — 80061 LIPID PANEL: CPT | Performed by: NURSE PRACTITIONER

## 2024-04-21 PROCEDURE — 82435 ASSAY OF BLOOD CHLORIDE: CPT | Performed by: NURSE PRACTITIONER

## 2024-04-21 PROCEDURE — 85025 COMPLETE CBC W/AUTO DIFF WBC: CPT | Performed by: NURSE PRACTITIONER

## 2024-04-21 RX ORDER — ALBUTEROL SULFATE 0.83 MG/ML
2.5 SOLUTION RESPIRATORY (INHALATION) EVERY 2 HOUR PRN
Status: DISCONTINUED | OUTPATIENT
Start: 2024-04-21 | End: 2024-04-23 | Stop reason: HOSPADM

## 2024-04-21 RX ADMIN — ACETAMINOPHEN 975 MG: 325 TABLET ORAL at 20:54

## 2024-04-21 RX ADMIN — INSULIN GLARGINE 20 UNITS: 100 INJECTION, SOLUTION SUBCUTANEOUS at 20:55

## 2024-04-21 RX ADMIN — ENOXAPARIN SODIUM 40 MG: 40 INJECTION SUBCUTANEOUS at 20:55

## 2024-04-21 RX ADMIN — ACETAMINOPHEN 975 MG: 325 TABLET ORAL at 13:51

## 2024-04-21 RX ADMIN — METOPROLOL SUCCINATE 50 MG: 50 TABLET, EXTENDED RELEASE ORAL at 09:02

## 2024-04-21 RX ADMIN — ASPIRIN 81 MG: 81 TABLET, COATED ORAL at 09:02

## 2024-04-21 RX ADMIN — FLUTICASONE FUROATE AND VILANTEROL TRIFENATATE 1 PUFF: 200; 25 POWDER RESPIRATORY (INHALATION) at 08:11

## 2024-04-21 RX ADMIN — ROSUVASTATIN CALCIUM 20 MG: 10 TABLET, FILM COATED ORAL at 20:53

## 2024-04-21 RX ADMIN — ENOXAPARIN SODIUM 40 MG: 40 INJECTION SUBCUTANEOUS at 09:03

## 2024-04-21 ASSESSMENT — COGNITIVE AND FUNCTIONAL STATUS - GENERAL
PERSONAL GROOMING: A LITTLE
STANDING UP FROM CHAIR USING ARMS: A LOT
STANDING UP FROM CHAIR USING ARMS: A LOT
MOVING TO AND FROM BED TO CHAIR: A LOT
DRESSING REGULAR UPPER BODY CLOTHING: A LITTLE
CLIMB 3 TO 5 STEPS WITH RAILING: TOTAL
MOBILITY SCORE: 13
STANDING UP FROM CHAIR USING ARMS: A LOT
WALKING IN HOSPITAL ROOM: TOTAL
MOBILITY SCORE: 15
CLIMB 3 TO 5 STEPS WITH RAILING: TOTAL
MOVING FROM LYING ON BACK TO SITTING ON SIDE OF FLAT BED WITH BEDRAILS: A LITTLE
MOVING TO AND FROM BED TO CHAIR: A LOT
TURNING FROM BACK TO SIDE WHILE IN FLAT BAD: A LOT
DAILY ACTIVITIY SCORE: 14
WALKING IN HOSPITAL ROOM: A LOT
MOVING TO AND FROM BED TO CHAIR: TOTAL
DAILY ACTIVITIY SCORE: 24
HELP NEEDED FOR BATHING: A LOT
TOILETING: TOTAL
MOBILITY SCORE: 12
EATING MEALS: A LITTLE
DAILY ACTIVITIY SCORE: 24
CLIMB 3 TO 5 STEPS WITH RAILING: TOTAL
DRESSING REGULAR LOWER BODY CLOTHING: A LOT
WALKING IN HOSPITAL ROOM: A LOT

## 2024-04-21 ASSESSMENT — PAIN SCALES - GENERAL
PAINLEVEL_OUTOF10: 3
PAINLEVEL_OUTOF10: 0 - NO PAIN
PAINLEVEL_OUTOF10: 0 - NO PAIN

## 2024-04-21 ASSESSMENT — PAIN - FUNCTIONAL ASSESSMENT
PAIN_FUNCTIONAL_ASSESSMENT: 0-10

## 2024-04-21 ASSESSMENT — ACTIVITIES OF DAILY LIVING (ADL): BATHING_ASSISTANCE: MODERATE

## 2024-04-21 NOTE — CONSULTS
Cardiology Consult Note    Inpatient consult to Cardiology  Consult performed by: Ace Zacarias MD  Consult ordered by: Ignacio Del Rio, APRN-CNP         Liyah Márquez is a 59 y.o. female on day 0 of admission presenting with Weakness generalized.      Subjective   59-year-old with polymyositis.  History of SVT status post ablation 2014 with Dr. Alcazar.  Follows with cardiology for [facility].  Has had symptoms of chest pain for which cardiac catheterization revealed normal coronary arteries.  She does have diabetes and hypertension along with sleep apnea.  Yesterday, while sitting in a chair pushing, she thought she fell asleep.  Her granddaughters found she had passed out and slumped over.  She had a loss of bowel control.  She evidently got up and went to the restroom but does not does not remember anything until she was in the emergency room.  Somewhat confused afterwards.  Increased weakness even beyond her baseline from her polymyositis.  No seizure activity that were aware of.  No chest pains or pressures she did have some fluttering in her chest.  Biomarkers normal.  Cardiology asked to evaluate for possible syncope.  She has had ongoing diarrhea today       ROS:  10 systems reviewed other than what is mentioned above.     PMH  1.  PSVT status post ablation 2014 Dr. Alcazar  2.  Polymyositis  3.  Atypical chest pain with normal coronary catheterizations in the past in 2017  4.  Hypertension  5.  Obstructive sleep apnea  6.  Obesity  7.  Hyperlipidemia  8.  Hysterectomy BSO  9.  Right Achilles tendon repair    Past Medical History:  History reviewed. No pertinent past medical history.    Problem List Items Addressed This Visit    None  Visit Diagnoses       Generalized weakness    -  Primary    Cerebrovascular accident (CVA), unspecified mechanism (Multi)        TIA (transient ischemic attack)        Relevant Orders    Transthoracic Echo (TTE) Complete            Family History:  No relevant family history  has been documented for this patient.    Medications:  Prior to Admission medications    Medication Sig Start Date End Date Taking? Authorizing Provider   acetaminophen (Tylenol 8 HOUR) 650 mg ER tablet Take 2 tablets (1,300 mg) by mouth every 8 hours if needed for mild pain (1 - 3). 3/27/24  Yes Historical Provider, MD   albuterol 90 mcg/actuation inhaler Inhale 2 puffs every 4 hours if needed for shortness of breath or wheezing. 1/29/24  Yes Historical Provider, MD   alpha lipoic acid 100 mg capsule Take 100 mg by mouth once daily.   Yes Historical Provider, MD   ASHWAGANDHA EXTRACT ORAL Take 1 capsule by mouth once daily.   Yes Historical Provider, MD   cholecalciferol (Vitamin D-3) 50,000 unit capsule Take 1 capsule (50,000 Units) by mouth 1 (one) time per week. Every Monday 7/19/23  Yes Historical Provider, MD   cyclobenzaprine (Flexeril) 10 mg tablet Take 1 tablet (10 mg) by mouth every 12 hours if needed for muscle spasms. 10/20/23  Yes Historical Provider, MD   diclofenac sodium (Voltaren) 1 % gel Apply 4.5 inches (1 Application) topically 4 times a day as needed. 4/7/23  Yes Historical Provider, MD   diphenhydrAMINE (Banophen) 25 mg tablet Take 1 tablet (25 mg) by mouth as needed at bedtime. 3/25/24  Yes Historical Provider, MD   empagliflozin (Jardiance) 25 mg Take 1 tablet (25 mg) by mouth once daily with breakfast. 8/28/23  Yes Historical Provider, MD   flaxseed oiL oil Take 1 capsule by mouth once daily.   Yes Historical Provider, MD   fluticasone furoate-vilanteroL (Breo Ellipta) 200-25 mcg/dose inhaler Inhale 1 puff every other day. 11/28/23  Yes Historical Provider, MD   insulin glargine (Toujeo Max U-300 SoloStar) 300 unit/mL (3 mL) injection Inject 25 Units under the skin once daily in the evening. 12/12/23  Yes Historical Provider, MD   lidocaine 4 % cream Apply 0.1 g topically once daily as needed.   Yes Historical Provider, MD   lisinopril 40 mg tablet Take 1 tablet (40 mg) by mouth once daily.  9/12/23 9/11/24 Yes Historical Provider, MD   meloxicam (Mobic) 7.5 mg tablet Take 1 tablet (7.5 mg) by mouth every 6 hours if needed. 8/31/22  Yes Historical Provider, MD   metoprolol succinate XL (Toprol-XL) 50 mg 24 hr tablet Take 1 tablet (50 mg) by mouth once daily. 8/31/22  Yes Historical Provider, MD   promethazine (Phenergan) 25 mg tablet Take 1 tablet (25 mg) by mouth once daily as needed for nausea or vomiting. 8/31/22  Yes Historical Provider, MD   rosuvastatin (Crestor) 20 mg tablet Take 1 tablet (20 mg) by mouth once daily at bedtime. 3/25/24  Yes Historical Provider, MD   semaglutide (Ozempic) 1 mg/dose (4 mg/3 mL) pen injector Inject 1 mg under the skin 1 (one) time per week. Every Monday 12/1/23  Yes Historical Provider, MD   triamterene-hydrochlorothiazid (Dyazide) 37.5-25 mg capsule Take 1 capsule by mouth once daily in the morning. 7/25/23 7/24/24 Yes Historical Provider, MD   ubrogepant (Ubrelvy) 100 mg tablet tablet Take 0.5 tablets (50 mg) by mouth 1 time if needed (migraines). 11/10/23 5/12/24 Yes Historical Provider, MD     Current Facility-Administered Medications   Medication Dose Route Frequency Provider Last Rate Last Admin    acetaminophen (Tylenol) tablet 975 mg  975 mg oral q8h FREDY Fontanez   975 mg at 04/20/24 2229    albuterol 2.5 mg /3 mL (0.083 %) nebulizer solution 2.5 mg  2.5 mg nebulization q2h PRN Madhu Barrera,         aspirin EC tablet 81 mg  81 mg oral Daily FREDY Fontanez   81 mg at 04/21/24 0902    cyclobenzaprine (Flexeril) tablet 10 mg  10 mg oral q12h PRN SANDEEP Fontanez-CNP        dextrose 50 % injection 12.5 g  12.5 g intravenous q15 min PRN SANDEEP Fontanez-CNP        dextrose 50 % injection 25 g  25 g intravenous q15 min PRN SANDEEP Fontanez-CNP        diclofenac sodium (Voltaren) 1 % gel 4 g  4 g Topical 4x daily PRN SANDEEP Fontanez-CNP        diphenhydrAMINE (BENADryl) capsule 25 mg  25 mg oral Nightly PRN Ignacio Del Rio,  SANDEEP-CNP        enoxaparin (Lovenox) syringe 40 mg  40 mg subcutaneous q12h ELANA Ignacio Del Rio APRN-CNP   40 mg at 04/21/24 0903    fluticasone furoate-vilanteroL (Breo Ellipta) 200-25 mcg/dose inhaler 1 puff  1 puff inhalation Every other day SANDEEP Fontanez-CNP   1 puff at 04/21/24 0811    glucagon (Glucagen) injection 1 mg  1 mg intramuscular q15 min PRN Ignaico Del Rio APRN-CNP        glucagon (Glucagen) injection 1 mg  1 mg intramuscular q15 min PRN Ignacio Del Rio APRN-BOYD        hydrALAZINE (Apresoline) injection 10 mg  10 mg intravenous q20 min PRN SANDEEP Fontanez-CNP        Followed by    [START ON 4/22/2024] hydrALAZINE (Apresoline) tablet 25 mg  25 mg oral q6h PRN Ignacio Del Rio APRN-CNP        insulin glargine (Lantus) injection 20 Units  20 Units subcutaneous q24h SANDEEP Fontanez-CNP        insulin lispro (HumaLOG) injection 0-10 Units  0-10 Units subcutaneous TID with meals FREDY Fontanez        labetaloL (Normodyne,Trandate) injection 10 mg  10 mg intravenous q10 min PRN SANDEEP Fontanez-CNP        lidocaine (LMX) 4 % cream 0.1 g  1 Application Topical TID PRN SANDEEP Fontanez-CNP        [Held by provider] lisinopril tablet 40 mg  40 mg oral Daily Ignacio Del Rio APRN-CNP        LORazepam (Ativan) injection 2 mg  2 mg intravenous Once PRN Ignacio Del Rio APRN-CNP        meloxicam (Mobic) tablet 7.5 mg  7.5 mg oral q6h PRN Ignacio Del Rio APRN-CNP        metoprolol succinate XL (Toprol-XL) 24 hr tablet 50 mg  50 mg oral Daily SANDEEP Fontanez-CNP   50 mg at 04/21/24 0902    polyethylene glycol (Glycolax, Miralax) packet 17 g  17 g oral BID PRN Ignacio Del Rio APRN-BOYD        rosuvastatin (Crestor) tablet 20 mg  20 mg oral Nightly SANDEEP Fontanez-CNP   20 mg at 04/20/24 2227    [Held by provider] triamterene-hydrochlorothiazid (Maxzide-25) 37.5-25 mg per tablet 1 tablet  1 tablet oral Daily FREDY Fontanez           Allergies:  Allergies   Allergen Reactions     Haptens - Plastic And Glue Series Anaphylaxis    Adhesive Tape-Silicones Other     BLISTERING OF SKIN   All surgical glues    Carrot Itching    Denture Care Products Other     Fixadent- mouth swells and rash    Tizanidine Hallucinations     headaches, nightmares, sweats & hot flashes, visual illusions (dark shadows while sleeping)    Aspartame Headache     Causing migraines    Latex Rash     blisters    ITCHING    Nickel Rash    Sucralose Headache     Causes migraines       Social History:  Social History     Tobacco Use    Smoking status: Never     Passive exposure: Never    Smokeless tobacco: Never   Substance Use Topics    Alcohol use: Not Currently       Physical Exam:  Last Recorded Vitals  /72 (Patient Position: Sitting)   Pulse 82   Temp 36.3 °C (97.3 °F)   Resp 16   Wt 136 kg (300 lb)   SpO2 97%   Intake/Output last 3 Shifts:    Intake/Output Summary (Last 24 hours) at 4/21/2024 1114  Last data filed at 4/21/2024 0400  Gross per 24 hour   Intake 1120 ml   Output 750 ml   Net 370 ml       Admission Weight  Weight: 136 kg (300 lb) (04/20/24 1619)    Daily Weight  04/20/24 : 136 kg (300 lb)      Patient is alert and oriented x3.  HEENT is unremarkable mucous members are moist  Neck no JVP no bruits upstrokes are full no thyromegaly  Lungs are clear bilaterally.  No wheezing crackles or rales  Heart regular rhythm normal S1-S2 there is no S3 no murmurs are heard.  Abdomen is soft vessels are positive nontender nondistended no organomegaly no pulsatile masses  Extremities have no edema.  Distal pulses present palpable.  Neuro is grossly nonfocal  Skin has no rashes     Image Results  CT abdomen pelvis w IV contrast  Narrative: Interpreted By:  Jose Luis Leal,   STUDY:  CT ABDOMEN PELVIS W IV CONTRAST;  4/20/2024 9:06 pm      INDICATION:  Signs/Symptoms:abdominal pain, diarhrea.      COMPARISON:  None.      ACCESSION NUMBER(S):  [id number]      ORDERING CLINICIAN:  SHAKILA WALL       TECHNIQUE:  Contiguous axial images of the abdomen and pelvis were obtained after  the intravenous administration of  contrast. Coronal and sagittal  reformatted images were obtained from the axial images.      FINDINGS:  There is limited evaluation of the lung bases.  Bibasilar subsegmental atelectasis.          There is hepatic steatosis. The gallbladder is present. No dilatation  of the common bile duct.      The pancreas, spleen, and adrenal glands appear unremarkable.      Symmetric enhancement of the kidneys. There is contrast in the  bilateral renal collecting systems limiting evaluation for calculus.  No hydronephrosis.      There is thinning and diastasis of the midline rectus musculature  with bulge of abdominal wall and fat containing hernia.      No evidence of bowel obstruction.      Urinary bladder is underdistended and not well evaluated.      No significant free abdominal or pelvic fluid.      Multilevel degenerative change of the lumbar spine.      Impression: No evidence of acute abnormality of the abdominal viscera.      No evidence of bowel obstruction.      Hepatic steatosis.          MACRO:  None      Signed by: Jose Luis Leal 4/20/2024 9:45 PM  Dictation workstation:   [id number]  XR chest 1 view  Narrative: Interpreted By:  Hao Martínez,   STUDY:  XR CHEST 1 VIEW;  4/20/2024 4:59 pm      INDICATION:  Signs/Symptoms:generalized weakness.      COMPARISON:  None.      ACCESSION NUMBER(S):  [id number]      ORDERING CLINICIAN:  ROYA HONG      FINDINGS:  CARDIOMEDIASTINAL SILHOUETTE AND VASCULATURE:      Cardiac size:  Within normal limits.  Aortic shadow:  Within normal limits.      Mediastinal contours: Within normal limits.      Pulmonary vasculature:  The central vasculature is unremarkable      LUNGS:  Lungs are clear.      ABDOMEN AND OTHER FINDINGS:  No remarkable upper abdominal findings.      BONES:  No acute osseous changes.      Impression: 1.  No active cardiopulmonary disease.   There has not been  significant interval change from the prior exam.      Signed by: Hao Martínez 4/20/2024 5:25 PM  Dictation workstation:   [id number]  CT head wo IV contrast  Narrative: Interpreted By:  Hao Martínez,   STUDY:  CT HEAD WO IV CONTRAST;  4/20/2024 4:56 pm      INDICATION:  Signs/Symptoms:generalized weakness.      COMPARISON:  01/19/2016      ACCESSION NUMBER(S):  [id number]      ORDERING CLINICIAN:  ROYA HONG      TECHNIQUE:  Sequential trans axial images were obtained  .      FINDINGS:  INTRACRANIAL:      CORTICAL SULCI AND EXTRA-AXIAL SPACES:  Unremarkable.      VENTRICULAR SYSTEM:  Unremarkable without significant dilatation.      CEREBRAL PARENCHYMA:  Unremarkable without significant degenerative  change.There is no evidence of definite subacute infarction,  intracranial hemorrhage or mass.      EXTRACRANIAL:  Visualized paranasal sinuses and mastoids are clear.  The calvarium is intact.      Impression: Unremarkable exam.  There has not been significant interval change from the prior exam.      Signed by: Hao Martínez 4/20/2024 5:24 PM  Dictation workstation:   [id number]      x  Relevant Results:  Results for orders placed or performed during the hospital encounter of 04/20/24 (from the past 24 hour(s))   POCT GLUCOSE   Result Value Ref Range    POCT Glucose 142 (H) 74 - 99 mg/dL   CBC and Auto Differential   Result Value Ref Range    WBC 11.9 (H) 4.4 - 11.3 x10*3/uL    nRBC 0.0 0.0 - 0.0 /100 WBCs    RBC 4.49 4.00 - 5.20 x10*6/uL    Hemoglobin 12.3 12.0 - 16.0 g/dL    Hematocrit 39.5 36.0 - 46.0 %    MCV 88 80 - 100 fL    MCH 27.4 26.0 - 34.0 pg    MCHC 31.1 (L) 32.0 - 36.0 g/dL    RDW 13.6 11.5 - 14.5 %    Platelets 286 150 - 450 x10*3/uL    Neutrophils % 39.7 40.0 - 80.0 %    Immature Granulocytes %, Automated 0.3 0.0 - 0.9 %    Lymphocytes % 50.4 13.0 - 44.0 %    Monocytes % 6.3 2.0 - 10.0 %    Eosinophils % 2.6 0.0 - 6.0 %    Basophils % 0.7 0.0 - 2.0 %    Neutrophils Absolute 4.71  1.20 - 7.70 x10*3/uL    Immature Granulocytes Absolute, Automated 0.03 0.00 - 0.70 x10*3/uL    Lymphocytes Absolute 5.98 (H) 1.20 - 4.80 x10*3/uL    Monocytes Absolute 0.75 0.10 - 1.00 x10*3/uL    Eosinophils Absolute 0.31 0.00 - 0.70 x10*3/uL    Basophils Absolute 0.08 0.00 - 0.10 x10*3/uL   Magnesium   Result Value Ref Range    Magnesium 1.84 1.60 - 2.40 mg/dL   Comprehensive metabolic panel   Result Value Ref Range    Glucose 151 (H) 74 - 99 mg/dL    Sodium 139 136 - 145 mmol/L    Potassium 3.5 3.5 - 5.3 mmol/L    Chloride 103 98 - 107 mmol/L    Bicarbonate 26 21 - 32 mmol/L    Anion Gap 14 10 - 20 mmol/L    Urea Nitrogen 13 6 - 23 mg/dL    Creatinine 0.71 0.50 - 1.05 mg/dL    eGFR >90 >60 mL/min/1.73m*2    Calcium 9.1 8.6 - 10.3 mg/dL    Albumin 4.0 3.4 - 5.0 g/dL    Alkaline Phosphatase 58 33 - 110 U/L    Total Protein 8.0 6.4 - 8.2 g/dL    AST 15 9 - 39 U/L    Bilirubin, Total 0.3 0.0 - 1.2 mg/dL    ALT 14 7 - 45 U/L   Lactate   Result Value Ref Range    Lactate 1.9 0.4 - 2.0 mmol/L   Lipase   Result Value Ref Range    Lipase 37 9 - 82 U/L   Creatine Kinase   Result Value Ref Range    Creatine Kinase 473 (H) 0 - 215 U/L   B-Type Natriuretic Peptide   Result Value Ref Range    BNP 15 0 - 99 pg/mL   Influenza A, and B PCR   Result Value Ref Range    Flu A Result Not Detected Not Detected    Flu B Result Not Detected Not Detected   Sars-CoV-2 PCR   Result Value Ref Range    Coronavirus 2019, PCR Not Detected Not Detected   RSV PCR   Result Value Ref Range    RSV PCR Not Detected Not Detected   Troponin I, High Sensitivity, Initial   Result Value Ref Range    Troponin I, High Sensitivity 3 0 - 13 ng/L   Hemoglobin A1c   Result Value Ref Range    Hemoglobin A1C 5.8 (H) see below %    Estimated Average Glucose 120 Not Established mg/dL   Troponin, High Sensitivity, 1 Hour   Result Value Ref Range    Troponin I, High Sensitivity 3 0 - 13 ng/L   Urinalysis with Reflex Culture and Microscopic   Result Value Ref Range     Color, Urine Yellow Straw, Yellow    Appearance, Urine Hazy (N) Clear    Specific Gravity, Urine 1.018 1.005 - 1.035    pH, Urine 5.0 5.0, 5.5, 6.0, 6.5, 7.0, 7.5, 8.0    Protein, Urine NEGATIVE NEGATIVE mg/dL    Glucose, Urine >=500 (3+) (A) NEGATIVE mg/dL    Blood, Urine SMALL (1+) (A) NEGATIVE    Ketones, Urine NEGATIVE NEGATIVE mg/dL    Bilirubin, Urine NEGATIVE NEGATIVE    Urobilinogen, Urine <2.0 <2.0 mg/dL    Nitrite, Urine POSITIVE (A) NEGATIVE    Leukocyte Esterase, Urine SMALL (1+) (A) NEGATIVE   Microscopic Only, Urine   Result Value Ref Range    WBC, Urine 6-10 (A) 1-5, NONE /HPF    RBC, Urine 6-10 (A) NONE, 1-2, 3-5 /HPF    Squamous Epithelial Cells, Urine 1-9 (SPARSE) Reference range not established. /HPF    Bacteria, Urine 1+ (A) NONE SEEN /HPF    Mucus, Urine 1+ Reference range not established. /LPF   POCT GLUCOSE   Result Value Ref Range    POCT Glucose 98 74 - 99 mg/dL   POCT GLUCOSE   Result Value Ref Range    POCT Glucose 103 (H) 74 - 99 mg/dL   Protime-INR   Result Value Ref Range    Protime 11.7 9.8 - 12.8 seconds    INR 1.0 0.9 - 1.1   Lipid Panel   Result Value Ref Range    Cholesterol 159 0 - 199 mg/dL    HDL-Cholesterol 40.6 mg/dL    Cholesterol/HDL Ratio 3.9     LDL Calculated 89 <=99 mg/dL    VLDL 29 0 - 40 mg/dL    Triglycerides 146 0 - 149 mg/dL    Non HDL Cholesterol 118 0 - 149 mg/dL   Basic Metabolic Panel   Result Value Ref Range    Glucose 105 (H) 74 - 99 mg/dL    Sodium 140 136 - 145 mmol/L    Potassium 3.6 3.5 - 5.3 mmol/L    Chloride 108 (H) 98 - 107 mmol/L    Bicarbonate 26 21 - 32 mmol/L    Anion Gap 10 10 - 20 mmol/L    Urea Nitrogen 14 6 - 23 mg/dL    Creatinine 0.63 0.50 - 1.05 mg/dL    eGFR >90 >60 mL/min/1.73m*2    Calcium 8.7 8.6 - 10.3 mg/dL   CBC and Auto Differential   Result Value Ref Range    WBC 8.7 4.4 - 11.3 x10*3/uL    nRBC 0.0 0.0 - 0.0 /100 WBCs    RBC 3.94 (L) 4.00 - 5.20 x10*6/uL    Hemoglobin 10.7 (L) 12.0 - 16.0 g/dL    Hematocrit 35.2 (L) 36.0 - 46.0 %     MCV 89 80 - 100 fL    MCH 27.2 26.0 - 34.0 pg    MCHC 30.4 (L) 32.0 - 36.0 g/dL    RDW 14.0 11.5 - 14.5 %    Platelets 281 150 - 450 x10*3/uL    Neutrophils % 52.8 40.0 - 80.0 %    Immature Granulocytes %, Automated 0.3 0.0 - 0.9 %    Lymphocytes % 36.1 13.0 - 44.0 %    Monocytes % 8.4 2.0 - 10.0 %    Eosinophils % 1.9 0.0 - 6.0 %    Basophils % 0.5 0.0 - 2.0 %    Neutrophils Absolute 4.61 1.20 - 7.70 x10*3/uL    Immature Granulocytes Absolute, Automated 0.03 0.00 - 0.70 x10*3/uL    Lymphocytes Absolute 3.15 1.20 - 4.80 x10*3/uL    Monocytes Absolute 0.73 0.10 - 1.00 x10*3/uL    Eosinophils Absolute 0.17 0.00 - 0.70 x10*3/uL    Basophils Absolute 0.04 0.00 - 0.10 x10*3/uL       Results from last 7 days   Lab Units 04/21/24  0725 04/20/24  1635   PROTEIN TOTAL g/dL  --  8.0   BILIRUBIN TOTAL mg/dL  --  0.3   ALK PHOS U/L  --  58   ALT U/L  --  14   AST U/L  --  15   GLUCOSE mg/dL 105* 151*   TRIGLYCERIDES mg/dL 146  --    HDL mg/dL 40.6  --    HEMOGLOBIN A1C %  --  5.8*   BNP pg/mL  --  15       Assessment/Plan    PMH  1.  PSVT status post ablation 2014 Dr. Alcazar  2.  Polymyositis  3.  Atypical chest pain with normal coronary catheterizations in the past in 2017  4.  Hypertension  5.  Obstructive sleep apnea  6.  Obesity  7.  Hyperlipidemia  8.  Hysterectomy BSO  9.  Right Achilles tendon repair    4/21/2024    1.  Possible syncope.  There was loss of bowel control.  Strong suspicion for neurologic seizure activity.  2D echo pending.  Will monitor on telemetry.  Remains in sinus rhythm.  No arrhythmias thus far.  2.  History of PSVT.  No recurrence at this time  3.  Diarrhea.  Defer to the primary service    I have reviewed the records from the [facility].  Will await her echo.  Thank you for this consult      Ace Zacarias MD

## 2024-04-21 NOTE — PROGRESS NOTES
Occupational Therapy    Occupational Therapy    Evaluation    Patient Name: Liyah Márquez  MRN: [id number]  Today's Date: 4/21/2024  Time Calculation  Start Time: 1000  Stop Time: 1030  Time Calculation (min): 30 min    Assessment  IP OT Assessment  OT Assessment:  (OVERALL FUNCTIONAL MOBILITY GETTING IN/OUT OF BED AND SIT TO STAND SEEM TO HAVE REGRESSED FROM BASELINE REPORT - PATIENT CURRENTLY NEEDS 24/7 SUPERVSION AND IS A FALL RISK.)  Prognosis: Fair  Barriers to Discharge:  (TBD)  Evaluation/Treatment Tolerance: Patient limited by fatigue, Patient limited by pain  Medical Staff Made Aware: Yes  End of Session Communication: Bedside nurse  End of Session Patient Position: Bed, 2 rail up    Plan:  Treatment Interventions: ADL retraining, Functional transfer training, UE strengthening/ROM, Endurance training, Patient/family training  OT Frequency: 5 times per week  OT Discharge Recommendations: Moderate intensity level of continued care    Subjective     Current Problem:  1. Generalized weakness        2. Cerebrovascular accident (CVA), unspecified mechanism (Multi)        3. TIA (transient ischemic attack)  Transthoracic Echo (TTE) Complete    Transthoracic Echo (TTE) Complete          General:  General  Reason for Referral: OT EVAL AND TREAT  Referred By: [facility]  Past Medical History Relevant to Rehab: DM, FIBROMYLAGIA  Family/Caregiver Present: No  Co-Treatment: PT  Prior to Session Communication: Bedside nurse  Patient Position Received: Bed, 2 rail up  General Comment:  (PATIENT ADMITTED WITH WEAKNESS/ LETHARGY - HEAD CT WAS (-) , MRI PENDING- R/O C- DIFF)    Precautions:  Medical Precautions: No known precautions/limitation    Vital Signs:       Pain:  Pain Assessment  Pain Assessment: 0-10    Objective     Cognition:  Overall Cognitive Status: Within Functional Limits             Home Living:  Type of Home: Apartment  Lives With: Alone (HAS A FIANCE THAT IS SUPPORTIVE AND WORKS FT, AIDES ARE ONSITE SUNDAY -  WEDNESDAY 11-3 , FAMILY SUPPORTIVE)  Home Adaptive Equipment: Wheelchair-power, Walker rolling or standard, Hospital bed  Home Layout: One level  Home Access: No concerns  Bathroom Shower/Tub: Tub/shower unit  Bathroom Equipment: Tub transfer bench, Grab bars in shower     Prior Function:  Level of [place]: Independent with ADLs and functional transfers  Receives Help From: Family, Home health  Homemaking Assistance:  (AIDES AND FAMILY HANDLED THIS)  Ambulatory Assistance:  (LIMITED - MOSTLY - STAD- PIVOT TO W/C X 4 YEARS PER PATIENT SECONDARY TO FIBROMYALGIC PAIN)  Hand Dominance: Right    IADL History:  Homemaking Responsibilities: No  Current License: No  Occupation: Retired    ADL:  Eating Assistance: Independent  Grooming Assistance: Minimal  Bathing Assistance: Moderate  UE Dressing Assistance: Minimal  LE Dressing Assistance: Maximal  Toileting Assistance with Device: Total  Toileting Deficit:  (PUREWICK IN PLACE - PER PATIENT - SHE USES A PAD AT HOME)    Activity Tolerance:  Endurance: Tolerates less than 10 min exercise with changes in vital signs (C/O FEELNG LIGHTHEADED WHEN SITTING UP - BP /80 WITH NURSING TAKING VITALS WITH THERAPY IN THE ROOM .)    Bed Mobility/Transfers:   Bed Mobility  Bed Mobility: Yes  Bed Mobility 1  Bed Mobility 1: Supine to sitting  Level of Assistance 1: Moderate assistance  Transfers  Transfer: Yes  Transfer 1  Transfer From 1: Bed to  Transfer to 1: Stand  Technique 1: Sit to stand  Transfer Device 1: Walker  Transfer Level of Assistance 1: Moderate assistance, +2    Ambulation/Gait Training:  Functional Mobility  Functional Mobility Performed: Yes  Functional Mobility 1  Surface 1: Level tile  Device 1: Rolling walker  Assistance 1: Moderate assistance  Comments 1:  (1-2 SIDESTEPS ONLY AS PATENT C/O BACK PAIN)    Sitting Balance:  Static Sitting Balance  Static Sitting-Balance Support: Feet supported  Static Sitting-Level of Assistance: Contact guard    Standing  Balance:  Static Standing Balance  Static Standing-Balance Support: Bilateral upper extremity supported  Static Standing-Level of Assistance: Minimum assistance    Vision:     and Vision - Complex Assessment  Ocular Range of Motion: Within Functional Limits  Head Position: WFL  Tracking: WFL    Sensation:  Light Touch: No apparent deficits  Sharp/Dull: No apparent deficits    Strength:  Strength Comments: BUE STRENGTH 4-/5    Perception:  Inattention/Neglect: Appears intact    Coordination:  Movements are Fluid and Coordinated: Yes  Finger to Nose: Intact  Finger to Target: Intact     Hand Function:  Hand Function  Gross Grasp: Functional  Coordination: Functional    Extremities: RUE   RUE : Within Functional Limits and LUE   LUE: Within Functional Limits    Outcome Measures: [facility] Daily Activity  Putting on and taking off regular lower body clothing: A lot  Bathing (including washing, rinsing, drying): A lot  Putting on and taking off regular upper body clothing: A little  Toileting, which includes using toilet, bedpan or urinal: Total  Taking care of personal grooming such as brushing teeth: A little  Eating Meals: A little  Daily Activity - Total Score: 14                    EDUCATION:     Education Documentation  No documentation found.  Education Comments  No comments found.        Goals:   Encounter Problems       Encounter Problems (Active)       ADLs       Patient with complete lower body dressing with supervision level of assistance donning and doffing all LE clothes  with PRN adaptive equipment while edge of bed  (Progressing)       Start:  04/21/24    Expected End:  05/05/24            Patient will complete toileting including hygiene clothing management/hygiene with modified independent level of assistance and raised toilet seat and grab bars. (Progressing)       Start:  04/21/24    Expected End:  05/05/24               EXERCISE/STRENGTHENING       Patient will complete BUE exercises for  in order to  improve strength and activity for ADL performance.  (Progressing)       Start:  04/21/24    Expected End:  05/05/24               MOBILITY       Patient will perform Functional mobility min Household distances/Community Distances with modified independent level of assistance and least restrictive device, front wheeled walker, and power wheelchair in order to improve functional mobility. (Progressing)       Start:  04/21/24    Expected End:  05/05/24               TRANSFERS       Patient will perform bed mobility modified independent level of assistance and bed rails in order to improve safety and independence with mobility (Progressing)       Start:  04/21/24    Expected End:  05/05/24            Patient will complete sit to stand transfer with independent level of assistance and least restrictive device in order to improve safety and prepare for out of bed mobility. (Progressing)       Start:  04/21/24    Expected End:  05/05/24

## 2024-04-21 NOTE — CARE PLAN
The patient's goals for the shift include pt will sleep 4 hours during the night    The clinical goals for the shift include pt will be injury free    Over the shift, the patient did not make progress toward the following goals. Barriers to progression include calling for assistance. Recommendations to address these barriers include frequent rounds.

## 2024-04-21 NOTE — CONSULTS
Inpatient consult to Neurology  Consult performed by: Opal Bobby MD  Consult ordered by: SANDEEP Fontanez-CNP      History Of Present Illness  Liyah Márquez is a 59 y.o. female is admitted to hospital for generalized weakness and lethargy, unresponsiveness.  She was sitting down making shoes and suddenly had weakness, had a bowel movement as well during that time.  She did not lose consciousness or fall.  She has tingling and numbness on left side of her lower leg since last Wednesday.   She has chronic numbness in hands and feet.   She has hx of polymyositis, chronic pain and weakness from this.   Denies chest pain. Denies any prodrome to this event.   She has low back pain, chronic but has been worsening.   Follows at [facility] neurology for migraine headaches and was managed with Zonegran, occipital nerve blocks   No hx of seizures.   She gets acupuncture for pain.  PMH:  DM, fibromyalgia. DM neuropathy, bladder infection, polymyositis, asthma, depression, anxiety, HTN, HLD, CHF, carpal tunnel syndrome, back pain, hearing loss, GERD, CARISA, SVT, arthritis, osteoporosis.   No recent surgeries.   Surgical History  History reviewed. No pertinent surgical history.  Social History  Social History     Tobacco Use    Smoking status: Never     Passive exposure: Never    Smokeless tobacco: Never   Vaping Use    Vaping status: Never Used   Substance Use Topics    Alcohol use: Not Currently    Drug use: Never     Allergies  Haptens - plastic and glue series, Adhesive tape-silicones, Carrot, Denture care products, Tizanidine, Aspartame, Latex, Nickel, and Sucralose  Medications Prior to Admission   Medication Sig Dispense Refill Last Dose    acetaminophen (Tylenol 8 HOUR) 650 mg ER tablet Take 2 tablets (1,300 mg) by mouth every 8 hours if needed for mild pain (1 - 3).   4/19/2024    albuterol 90 mcg/actuation inhaler Inhale 2 puffs every 4 hours if needed for shortness of breath or wheezing.   4/19/2024    alpha lipoic  acid 100 mg capsule Take 100 mg by mouth once daily.   4/19/2024    ASHWAGANDHA EXTRACT ORAL Take 1 capsule by mouth once daily.   4/19/2024    cholecalciferol (Vitamin D-3) 50,000 unit capsule Take 1 capsule (50,000 Units) by mouth 1 (one) time per week. Every Monday 4/19/2024    cyclobenzaprine (Flexeril) 10 mg tablet Take 1 tablet (10 mg) by mouth every 12 hours if needed for muscle spasms.   4/19/2024    diclofenac sodium (Voltaren) 1 % gel Apply 4.5 inches (1 Application) topically 4 times a day as needed.   4/19/2024    diphenhydrAMINE (Banophen) 25 mg tablet Take 1 tablet (25 mg) by mouth as needed at bedtime.   4/19/2024    empagliflozin (Jardiance) 25 mg Take 1 tablet (25 mg) by mouth once daily with breakfast.   4/19/2024    flaxseed oiL oil Take 1 capsule by mouth once daily.   4/19/2024    fluticasone furoate-vilanteroL (Breo Ellipta) 200-25 mcg/dose inhaler Inhale 1 puff every other day.   4/19/2024    insulin glargine (Toujeo Max U-300 SoloStar) 300 unit/mL (3 mL) injection Inject 25 Units under the skin once daily in the evening.   4/19/2024    lidocaine 4 % cream Apply 0.1 g topically once daily as needed.   4/19/2024    lisinopril 40 mg tablet Take 1 tablet (40 mg) by mouth once daily.   4/19/2024    meloxicam (Mobic) 7.5 mg tablet Take 1 tablet (7.5 mg) by mouth every 6 hours if needed.   4/19/2024    metoprolol succinate XL (Toprol-XL) 50 mg 24 hr tablet Take 1 tablet (50 mg) by mouth once daily.   4/19/2024    promethazine (Phenergan) 25 mg tablet Take 1 tablet (25 mg) by mouth once daily as needed for nausea or vomiting.   4/19/2024    rosuvastatin (Crestor) 20 mg tablet Take 1 tablet (20 mg) by mouth once daily at bedtime.   4/19/2024    semaglutide (Ozempic) 1 mg/dose (4 mg/3 mL) pen injector Inject 1 mg under the skin 1 (one) time per week. Every Monday 4/19/2024    triamterene-hydrochlorothiazid (Dyazide) 37.5-25 mg capsule Take 1 capsule by mouth once daily in the morning.   4/19/2024  "   ubrogepant (Ubrelvy) 100 mg tablet tablet Take 0.5 tablets (50 mg) by mouth 1 time if needed (migraines).   4/19/2024       Review of Systems  Exam:   Appearance:  no acute distress, cooperative. Obese.   HEENT: normocephalic /atraumatic.  Cardiovascular/Lungs/Abdomen: No carotid bruits to auscultation bilaterally, heart is regular in rate and rhythm.  Extremities/Skin: mild lower leg edema b/l     NEUROLOGICAL EXAMINATION:    Mental status:  alert and oriented to person, place, date and situation.  remote memory and long term memory intact, fund of knowledge intact, attention and concentration intact.  No dysarthria. No signs of aphasia.     Cranial nerves:    II/III: Fundoscopic examination attempted at bedside, limited. Visual fields are full. Pupils are 2 mm and reactive bilaterally.  III/IV/VI: Extraocular movements are full with no nystagmus.   V: Facial sensation is intact to light touch.  VII: Face is symmetric.  VIII: hearing is intact bilaterally.  IX/X: Palate elevates symmetrically to phonation.  XI: Sternocleidomastoid is MRC 5/5 to strength testing.  XII: Tongue is midline.    Motor exam: Strength is MRC 5/5 throughout. tone is normal.    Sensory exam: Sensation is impaired in lower ext to LT , worse on the lateral lower leg below the knee    Reflexes: Reflexes are 2+ and symmetric. Bilateral plantar responses are flexor.    Coordination: intact     Last Recorded Vitals  Blood pressure 118/56, pulse 75, temperature 35.6 °C (96.1 °F), temperature source Temporal, resp. rate 18, height 1.803 m (5' 11\"), weight 136 kg (300 lb), SpO2 97%.    Relevant Results  Scheduled medications  acetaminophen, 975 mg, oral, q8h  aspirin, 81 mg, oral, Daily  enoxaparin, 40 mg, subcutaneous, q12h ELANA  fluticasone furoate-vilanteroL, 1 puff, inhalation, Every other day  insulin glargine, 20 Units, subcutaneous, q24h  insulin lispro, 0-10 Units, subcutaneous, TID with meals  [Held by provider] lisinopril, 40 mg, oral, " Daily  metoprolol succinate XL, 50 mg, oral, Daily  rosuvastatin, 20 mg, oral, Nightly  [Held by provider] triamterene-hydrochlorothiazid, 1 tablet, oral, Daily      Continuous medications     PRN medications  PRN medications: albuterol, cyclobenzaprine, dextrose, dextrose, diclofenac sodium, diphenhydrAMINE, glucagon, glucagon, hydrALAZINE **FOLLOWED BY** [START ON 4/22/2024] hydrALAZINE, labetaloL, lidocaine, LORazepam, meloxicam, polyethylene glycol  Results for orders placed or performed during the hospital encounter of 04/20/24 (from the past 24 hour(s))   POCT GLUCOSE   Result Value Ref Range    POCT Glucose 142 (H) 74 - 99 mg/dL   CBC and Auto Differential   Result Value Ref Range    WBC 11.9 (H) 4.4 - 11.3 x10*3/uL    nRBC 0.0 0.0 - 0.0 /100 WBCs    RBC 4.49 4.00 - 5.20 x10*6/uL    Hemoglobin 12.3 12.0 - 16.0 g/dL    Hematocrit 39.5 36.0 - 46.0 %    MCV 88 80 - 100 fL    MCH 27.4 26.0 - 34.0 pg    MCHC 31.1 (L) 32.0 - 36.0 g/dL    RDW 13.6 11.5 - 14.5 %    Platelets 286 150 - 450 x10*3/uL    Neutrophils % 39.7 40.0 - 80.0 %    Immature Granulocytes %, Automated 0.3 0.0 - 0.9 %    Lymphocytes % 50.4 13.0 - 44.0 %    Monocytes % 6.3 2.0 - 10.0 %    Eosinophils % 2.6 0.0 - 6.0 %    Basophils % 0.7 0.0 - 2.0 %    Neutrophils Absolute 4.71 1.20 - 7.70 x10*3/uL    Immature Granulocytes Absolute, Automated 0.03 0.00 - 0.70 x10*3/uL    Lymphocytes Absolute 5.98 (H) 1.20 - 4.80 x10*3/uL    Monocytes Absolute 0.75 0.10 - 1.00 x10*3/uL    Eosinophils Absolute 0.31 0.00 - 0.70 x10*3/uL    Basophils Absolute 0.08 0.00 - 0.10 x10*3/uL   Magnesium   Result Value Ref Range    Magnesium 1.84 1.60 - 2.40 mg/dL   Comprehensive metabolic panel   Result Value Ref Range    Glucose 151 (H) 74 - 99 mg/dL    Sodium 139 136 - 145 mmol/L    Potassium 3.5 3.5 - 5.3 mmol/L    Chloride 103 98 - 107 mmol/L    Bicarbonate 26 21 - 32 mmol/L    Anion Gap 14 10 - 20 mmol/L    Urea Nitrogen 13 6 - 23 mg/dL    Creatinine 0.71 0.50 - 1.05 mg/dL     eGFR >90 >60 mL/min/1.73m*2    Calcium 9.1 8.6 - 10.3 mg/dL    Albumin 4.0 3.4 - 5.0 g/dL    Alkaline Phosphatase 58 33 - 110 U/L    Total Protein 8.0 6.4 - 8.2 g/dL    AST 15 9 - 39 U/L    Bilirubin, Total 0.3 0.0 - 1.2 mg/dL    ALT 14 7 - 45 U/L   Lactate   Result Value Ref Range    Lactate 1.9 0.4 - 2.0 mmol/L   Lipase   Result Value Ref Range    Lipase 37 9 - 82 U/L   Creatine Kinase   Result Value Ref Range    Creatine Kinase 473 (H) 0 - 215 U/L   B-Type Natriuretic Peptide   Result Value Ref Range    BNP 15 0 - 99 pg/mL   Influenza A, and B PCR   Result Value Ref Range    Flu A Result Not Detected Not Detected    Flu B Result Not Detected Not Detected   Sars-CoV-2 PCR   Result Value Ref Range    Coronavirus 2019, PCR Not Detected Not Detected   RSV PCR   Result Value Ref Range    RSV PCR Not Detected Not Detected   Troponin I, High Sensitivity, Initial   Result Value Ref Range    Troponin I, High Sensitivity 3 0 - 13 ng/L   Troponin, High Sensitivity, 1 Hour   Result Value Ref Range    Troponin I, High Sensitivity 3 0 - 13 ng/L   Urinalysis with Reflex Culture and Microscopic   Result Value Ref Range    Color, Urine Yellow Straw, Yellow    Appearance, Urine Hazy (N) Clear    Specific Gravity, Urine 1.018 1.005 - 1.035    pH, Urine 5.0 5.0, 5.5, 6.0, 6.5, 7.0, 7.5, 8.0    Protein, Urine NEGATIVE NEGATIVE mg/dL    Glucose, Urine >=500 (3+) (A) NEGATIVE mg/dL    Blood, Urine SMALL (1+) (A) NEGATIVE    Ketones, Urine NEGATIVE NEGATIVE mg/dL    Bilirubin, Urine NEGATIVE NEGATIVE    Urobilinogen, Urine <2.0 <2.0 mg/dL    Nitrite, Urine POSITIVE (A) NEGATIVE    Leukocyte Esterase, Urine SMALL (1+) (A) NEGATIVE   Microscopic Only, Urine   Result Value Ref Range    WBC, Urine 6-10 (A) 1-5, NONE /HPF    RBC, Urine 6-10 (A) NONE, 1-2, 3-5 /HPF    Squamous Epithelial Cells, Urine 1-9 (SPARSE) Reference range not established. /HPF    Bacteria, Urine 1+ (A) NONE SEEN /HPF    Mucus, Urine 1+ Reference range not  established. /LPF   POCT GLUCOSE   Result Value Ref Range    POCT Glucose 98 74 - 99 mg/dL   POCT GLUCOSE   Result Value Ref Range    POCT Glucose 103 (H) 74 - 99 mg/dL   Protime-INR   Result Value Ref Range    Protime 11.7 9.8 - 12.8 seconds    INR 1.0 0.9 - 1.1   Lipid Panel   Result Value Ref Range    Cholesterol 159 0 - 199 mg/dL    HDL-Cholesterol 40.6 mg/dL    Cholesterol/HDL Ratio 3.9     LDL Calculated 89 <=99 mg/dL    VLDL 29 0 - 40 mg/dL    Triglycerides 146 0 - 149 mg/dL    Non HDL Cholesterol 118 0 - 149 mg/dL   Basic Metabolic Panel   Result Value Ref Range    Glucose 105 (H) 74 - 99 mg/dL    Sodium 140 136 - 145 mmol/L    Potassium 3.6 3.5 - 5.3 mmol/L    Chloride 108 (H) 98 - 107 mmol/L    Bicarbonate 26 21 - 32 mmol/L    Anion Gap 10 10 - 20 mmol/L    Urea Nitrogen 14 6 - 23 mg/dL    Creatinine 0.63 0.50 - 1.05 mg/dL    eGFR >90 >60 mL/min/1.73m*2    Calcium 8.7 8.6 - 10.3 mg/dL   CBC and Auto Differential   Result Value Ref Range    WBC 8.7 4.4 - 11.3 x10*3/uL    nRBC 0.0 0.0 - 0.0 /100 WBCs    RBC 3.94 (L) 4.00 - 5.20 x10*6/uL    Hemoglobin 10.7 (L) 12.0 - 16.0 g/dL    Hematocrit 35.2 (L) 36.0 - 46.0 %    MCV 89 80 - 100 fL    MCH 27.2 26.0 - 34.0 pg    MCHC 30.4 (L) 32.0 - 36.0 g/dL    RDW 14.0 11.5 - 14.5 %    Platelets 281 150 - 450 x10*3/uL    Neutrophils % 52.8 40.0 - 80.0 %    Immature Granulocytes %, Automated 0.3 0.0 - 0.9 %    Lymphocytes % 36.1 13.0 - 44.0 %    Monocytes % 8.4 2.0 - 10.0 %    Eosinophils % 1.9 0.0 - 6.0 %    Basophils % 0.5 0.0 - 2.0 %    Neutrophils Absolute 4.61 1.20 - 7.70 x10*3/uL    Immature Granulocytes Absolute, Automated 0.03 0.00 - 0.70 x10*3/uL    Lymphocytes Absolute 3.15 1.20 - 4.80 x10*3/uL    Monocytes Absolute 0.73 0.10 - 1.00 x10*3/uL    Eosinophils Absolute 0.17 0.00 - 0.70 x10*3/uL    Basophils Absolute 0.04 0.00 - 0.10 x10*3/uL                  Result Date: 4/20/2024  Interpreted By:  Jose Luis Leal, STUDY: CT ABDOMEN PELVIS W IV CONTRAST;  4/20/2024  9:06 pm   INDICATION: Signs/Symptoms:abdominal pain, diarhrea.   COMPARISON: None.   ACCESSION NUMBER(S): [id number]   ORDERING CLINICIAN: SHAKILA WALL   TECHNIQUE: Contiguous axial images of the abdomen and pelvis were obtained after the intravenous administration of  contrast. Coronal and sagittal reformatted images were obtained from the axial images.   FINDINGS: There is limited evaluation of the lung bases. Bibasilar subsegmental atelectasis.     There is hepatic steatosis. The gallbladder is present. No dilatation of the common bile duct.   The pancreas, spleen, and adrenal glands appear unremarkable.   Symmetric enhancement of the kidneys. There is contrast in the bilateral renal collecting systems limiting evaluation for calculus. No hydronephrosis.   There is thinning and diastasis of the midline rectus musculature with bulge of abdominal wall and fat containing hernia.   No evidence of bowel obstruction.   Urinary bladder is underdistended and not well evaluated.   No significant free abdominal or pelvic fluid.   Multilevel degenerative change of the lumbar spine.       No evidence of acute abnormality of the abdominal viscera.   No evidence of bowel obstruction.   Hepatic steatosis.     MACRO: None   Signed by: Jose Luis Leal 4/20/2024 9:45 PM Dictation workstation:   [id number]    XR chest 1 view    Result Date: 4/20/2024  Interpreted By:  Hao Martínez, STUDY: XR CHEST 1 VIEW;  4/20/2024 4:59 pm   INDICATION: Signs/Symptoms:generalized weakness.   COMPARISON: None.   ACCESSION NUMBER(S): [id number]   ORDERING CLINICIAN: ROYA HONG   FINDINGS: CARDIOMEDIASTINAL SILHOUETTE AND VASCULATURE:   Cardiac size:  Within normal limits. Aortic shadow:  Within normal limits.   Mediastinal contours: Within normal limits.   Pulmonary vasculature:  The central vasculature is unremarkable   LUNGS: Lungs are clear.   ABDOMEN AND OTHER FINDINGS: No remarkable upper abdominal findings.   BONES: No acute osseous  changes.       1.  No active cardiopulmonary disease.  There has not been significant interval change from the prior exam.   Signed by: Hao Martínez 4/20/2024 5:25 PM Dictation workstation:   [id number]    CT head wo IV contrast    Result Date: 4/20/2024  Interpreted By:  Hao Martínez, STUDY: CT HEAD WO IV CONTRAST;  4/20/2024 4:56 pm   INDICATION: Signs/Symptoms:generalized weakness.   COMPARISON: 01/19/2016   ACCESSION NUMBER(S): [id number]   ORDERING CLINICIAN: ROYA HONG   TECHNIQUE: Sequential trans axial images were obtained  .   FINDINGS: INTRACRANIAL:   CORTICAL SULCI AND EXTRA-AXIAL SPACES:  Unremarkable.   VENTRICULAR SYSTEM:  Unremarkable without significant dilatation.   CEREBRAL PARENCHYMA:  Unremarkable without significant degenerative change.There is no evidence of definite subacute infarction, intracranial hemorrhage or mass.   EXTRACRANIAL: Visualized paranasal sinuses and mastoids are clear. The calvarium is intact.       Unremarkable exam. There has not been significant interval change from the prior exam.       More likely dehydration vs. Viral or infectious etiology for her weakness.    Obtain MRI brain to rule out stroke (less likely) and MRI L spine to rule out lumbar radiculopathy  and EEG.  Seizure precautions.  No clear indication to start antiepileptic medication at this time.   EMG/ncs will be helpful as outpatient.     Thank you for this consultation.  Please call neurologist on-call for any questions or concerns.

## 2024-04-21 NOTE — PROGRESS NOTES
Physical Therapy    Physical Therapy Evaluation    Patient Name: Liyah Márquez  MRN: [id number]  Today's Date: 4/21/2024   Time Calculation  Start Time: 0941  Stop Time: 1004  Time Calculation (min): 23 min    Assessment/Plan   PT Assessment  PT Assessment Results: Decreased strength, Decreased endurance, Impaired balance, Decreased mobility, Impaired sensation, Obesity, Decreased cognition  Rehab Prognosis: Good  Evaluation/Treatment Tolerance: Patient limited by fatigue  Medical Staff Made Aware: Yes  End of Session Communication: Bedside nurse, PCT/NA/CTA  Assessment Comment: Pt presents /c above impairments and decline from functional baseline. Pt will benefit from continued PT services at mod intensity to address above and maximize functional mobility.  End of Session Patient Position: Bed, 2 rail up, Alarm off, not on at start of session  IP OR SWING BED PT PLAN  Inpatient or Swing Bed: Inpatient  PT Plan  Treatment/Interventions: Bed mobility, Transfer training, Gait training, Balance training, Neuromuscular re-education, Strengthening, Endurance training, Therapeutic exercise, Therapeutic activity  PT Plan: Skilled PT  PT Frequency: 4 times per week  PT Discharge Recommendations: Moderate intensity level of continued care  PT - OK to Discharge: Yes    Subjective     Current Problem:  Patient Active Problem List   Diagnosis    Weakness generalized       General Visit Information:  General  Reason for Referral: PT eval and treat  Referred By: Quang  Past Medical History Relevant to Rehab: polymyositis, DM, fibromyalgia  Co-Treatment: OT  Co-Treatment Reason: possible two person assist, maximize pt's functional mobility  Prior to Session Communication: Bedside nurse  Patient Position Received: Bed, 2 rail up, Alarm off, not on at start of session  General Comment: Room number: 314   Pt presents with weakness, lethargy, diarrhea. Pt was at family's house and her family pt slumped over/passed out. Loss of bowel  control. Possible syncope. Head CT (-) acute. MRI pending. Isolation for Cdiff r/o. Cardio and neuro consulted. Pt cleared for therapy by nursing. Pt supine, agreeable.    Home Living:  Home Living  Home Living Comments: Pt lives alone in apt, no stairs to manage. Has fiance for some assist, but he works.    Prior Level of Function:  Prior Function Per Pt/Caregiver Report  Prior Function Comments: HHA 4days/wk, ~4hrs each day for ADL/IADL assist. Pt uses power w/c, but at baseline can ambulate a few steps into BR, etc. Has tub bench. Struggles /c hygiene tasks at times. Denies recent falls. Has 2 dtrs, some assist available. Pt keeps a rollator at one dtr's house.    Precautions:  Precautions  Precautions Comment: falls, lethargy, isolation       Objective     Pain:  Pain Assessment  Pain Assessment: 0-10  Pain Score: 0 - No pain    Cognition:  Cognition  Overall Cognitive Status: Within Functional Limits (lethargic, cues to open eyes at times during session.)  Orientation Level: Oriented X4    General Assessments:  General Observation  General Observation: activity orders: OOB /c A  lines: purewick, PIV   skin integrity: WFL   Activity Tolerance  Endurance: Tolerates less than 10 min exercise, no significant change in vital signs  Sensation  Sensation Comment: reports n/t BLE, L>R  Strength  Strength Comments:  (BLE grossly 3+/5, difficult to formally MMT d/t lethargy/dizziness EOB)           Dynamic Sitting Balance  Dynamic Sitting-Comments: F+  Dynamic Standing Balance  Dynamic Standing-Comments: F-    Functional Assessments:     Bed Mobility  Bed Mobility: Yes  Bed Mobility 1  Bed Mobility Comments 1: Supine<>Sit /c modAx1, HOB elevated, use of rail. Pt c/o dizziness EOB /s improvement. VSS.  Transfers  Transfer: Yes  Transfer 1  Trials/Comments 1: Sit<>Stand /c modAx2, fredi HHA, wide JAGRUTI, use of momentum.  Ambulation/Gait Training  Ambulation/Gait Training Performed:  (Pt ambulates 3' EOB, modAx2, Fredi HHA. Foot  flat pattern, discontinuous steps, moderate instability. Distance limited d/t dizziness/lethargy.)            Outcome Measures:  [facility] Basic Mobility  Turning from your back to your side while in a flat bed without using bedrails: A little  Moving from lying on your back to sitting on the side of a flat bed without using bedrails: A lot  Moving to and from bed to chair (including a wheelchair): A lot  Standing up from a chair using your arms (e.g. wheelchair or bedside chair): A lot  To walk in hospital room: A lot  Climbing 3-5 steps with railing: Total  Basic Mobility - Total Score: 12          Goals:  Encounter Problems       Encounter Problems (Active)       PT Problem       STG - Pt will transition supine <> sitting with SBA (Progressing)       Start:  04/21/24    Expected End:  05/05/24            STG - Pt will transfer STS with CGA (Progressing)       Start:  04/21/24    Expected End:  05/05/24            STG - Pt will amb 25' using WW with minAx1 (Progressing)       Start:  04/21/24    Expected End:  05/05/24            STG - Pt will maintain F+ dynamic standing balance to decrease risk of falls (Progressing)       Start:  04/21/24    Expected End:  05/05/24            STG - Pt will perform 2-3 sets of BLE therex x10 to maximize functional strength and independence  (Progressing)       Start:  04/21/24    Expected End:  05/05/24                 Education Documentation  Mobility Training, taught by Yina Orlando, PT at 4/21/2024 11:21 AM.  Learner: Patient  Readiness: Acceptance  Method: Explanation  Response: Verbalizes Understanding, Needs Reinforcement    Education Comments  No comments found.

## 2024-04-21 NOTE — H&P
History Of Present Illness  Liyah Márquez is a 59-year-old female with past medical history of obesity, CHF, SVT s/p ablation on Plavix, type 2 diabetes mellitus, diabetic neuropathy, hypertension, hyperlipidemia, GERD, constipation, CARISA on CPAP, asthma, osteoarthritis, depression, fibromyalgia, PMR, chronic pain, migraines, hard of hearing, ambulatory dysfunction 2/2 pain, and anemia.  Patient presents to the hospital following an acute onset of altered mental status and loss of consciousness at home.  Episode occurred around 3:30 PM this afternoon.  Daughter is at bedside, she was told by another family member abruptly became unresponsive at home while sitting on the couch, they report that she had little appeared to be shaking and had incontinence of bowel (describes liquid stool as nonbloody), possibly incontinence of urine however patient is unsure.  She does not have good recollection of the event.  Daughter reports that patient following event was acting confused, lethargic, and weak.  Her speech was slurred.  Patient also reports that she had weakness on the left side of the body with tingling in her feet and hands on the left side.  Felt as though she had occipital head pressure and felt off balance.  Symptoms have improved, currently having continued decreased sensation in the left lower extremity (unclear if due to peripheral neuropathy) and drift in left lower extremity.  Patient does endorse recent history of increased shortness of breath and rapid heart rate with activity.  She is not ambulatory at baseline and is up to wheelchair with 1-2 assist.  She has a home health aide acute to go home Sunday through Wednesday from 9a-2p.  Recent medication changes include starting Ozempic approximately 2 months ago, current dose 1 mg weekly.  She did have GI symptoms early on but they have improved.  Reports slight loss of 42 pounds, but states she has regained 6 pounds.  Reports recent history of Cipro for  bacterial vaginosis, followed by a course of doxycycline for skin lesions.  Patient normally has infrequent bowel movements, usually once weekly, recently started on lactulose, but feels gassy.  She does endorse abdominal tenderness, possible gas pains right upper quadrant and reports acid reflux/regurgitation.  Denies fevers, chills, sweats, vision or hearing changes, chest pains, cough, wheezing, nausea, vomiting, dysuria, or open wounds.     ER course: Hypertensive up to 197/84 but otherwise hemodynamically stable, afebrile, SpO2 100% on room air.  EKG per ER provider interpretation, sinus rhythm, ventricular rate 83, , QRS 88, Qtc 451. No STEMI.  WBC 11.9 otherwise CBC unremarkable.  Glucose 151, CMP normal otherwise.  Magnesium 1.84, lactate 1.9, BNP 15, lipase 37, high-sensitivity troponin 3, repeat 3.  . CXR no acute cardiopulmonary disease.  CT head was unremarkable.  Patient received morphine 4 mg and LR 1 L.  UA pending collection.    Past medical history: As above  Past surgical history: As above: Additional history of right Achilles tendon repair, left wrist bone excision, right wrist tendon repair, hysterectomy, tubal ligation, dental extractions, multiple skin lesions removed from her back, benign breast lumps removed  Family history: COPD, diabetes, hypertension, arthritis, pancreatic cancer (father  age 42), CAD, CVA, breast cancer  Social history: Former smoker-> cigars ~ 2 years, quit date .  No illicit drug use.  No alcohol abuse.  Wheelchair-bound, lives at home with her fiancé.  Has a home health aide that visits  through Wednesday from 9 AM to 2 PM.            Past Medical History  History reviewed. No pertinent past medical history.    Surgical History  History reviewed. No pertinent surgical history.     Social History  She has no history on file for tobacco use, alcohol use, and drug use.    Family History  No family history on file.     Allergies  Haptens - plastic  and glue series, Adhesive tape-silicones, Carrot, Denture care products, Tizanidine, Aspartame, Latex, Nickel, and Sucralose    Review of Systems     Physical Exam  Constitutional:       General: She is awake. She is not in acute distress.     Appearance: Normal appearance. She is not toxic-appearing.   HENT:      Head: Atraumatic.      Nose: Nose normal.      Mouth/Throat:      Mouth: Mucous membranes are moist.   Eyes:      General: No visual field deficit.     Extraocular Movements: Extraocular movements intact.      Conjunctiva/sclera: Conjunctivae normal.      Pupils: Pupils are equal, round, and reactive to light.   Cardiovascular:      Rate and Rhythm: Normal rate and regular rhythm.      Pulses: Normal pulses.      Heart sounds: No murmur heard.  Pulmonary:      Effort: Pulmonary effort is normal. No respiratory distress.      Breath sounds: Normal breath sounds.   Abdominal:      General: Bowel sounds are normal. There is no distension.      Palpations: Abdomen is soft.      Tenderness: There is abdominal tenderness. There is no guarding.   Musculoskeletal:         General: No swelling, deformity or signs of injury. Normal range of motion.      Cervical back: Neck supple.      Right lower leg: No edema.      Left lower leg: No edema.   Skin:     General: Skin is warm and dry.      Capillary Refill: Capillary refill takes less than 2 seconds.      Findings: No ecchymosis, erythema or wound.   Neurological:      General: No focal deficit present.      Mental Status: She is alert and oriented to person, place, and time.      Cranial Nerves: No cranial nerve deficit, dysarthria or facial asymmetry.      Motor: Weakness present.      Coordination: Coordination normal. Heel to Shin Test abnormal (unable to perform at baseline). Finger-Nose-Finger Test normal.   Psychiatric:         Mood and Affect: Mood normal.         Behavior: Behavior is cooperative.        Last Recorded Vitals  Blood pressure 180/87, pulse  "87, temperature 36.4 °C (97.5 °F), temperature source Temporal, resp. rate 16, height 1.803 m (5' 11\"), weight 136 kg (300 lb), SpO2 94%.    Relevant Results      XR chest 1 view    Result Date: 4/20/2024  Interpreted By:  Hao Martínez, STUDY: XR CHEST 1 VIEW;  4/20/2024 4:59 pm   INDICATION: Signs/Symptoms:generalized weakness.   COMPARISON: None.   ACCESSION NUMBER(S): [id number]   ORDERING CLINICIAN: ROYA HONG   FINDINGS: CARDIOMEDIASTINAL SILHOUETTE AND VASCULATURE:   Cardiac size:  Within normal limits. Aortic shadow:  Within normal limits.   Mediastinal contours: Within normal limits.   Pulmonary vasculature:  The central vasculature is unremarkable   LUNGS: Lungs are clear.   ABDOMEN AND OTHER FINDINGS: No remarkable upper abdominal findings.   BONES: No acute osseous changes.       1.  No active cardiopulmonary disease.  There has not been significant interval change from the prior exam.   Signed by: Hao Martínez 4/20/2024 5:25 PM Dictation workstation:   [id number]    CT head wo IV contrast    Result Date: 4/20/2024  Interpreted By:  Hao Martínez, STUDY: CT HEAD WO IV CONTRAST;  4/20/2024 4:56 pm   INDICATION: Signs/Symptoms:generalized weakness.   COMPARISON: 01/19/2016   ACCESSION NUMBER(S): [id number]   ORDERING CLINICIAN: ROYA HONG   TECHNIQUE: Sequential trans axial images were obtained  .   FINDINGS: INTRACRANIAL:   CORTICAL SULCI AND EXTRA-AXIAL SPACES:  Unremarkable.   VENTRICULAR SYSTEM:  Unremarkable without significant dilatation.   CEREBRAL PARENCHYMA:  Unremarkable without significant degenerative change.There is no evidence of definite subacute infarction, intracranial hemorrhage or mass.   EXTRACRANIAL: Visualized paranasal sinuses and mastoids are clear. The calvarium is intact.       Unremarkable exam. There has not been significant interval change from the prior exam.   Signed by: Hao Martínez 4/20/2024 5:24 PM Dictation workstation:   [id number]     Results for orders placed or " performed during the hospital encounter of 04/20/24 (from the past 24 hour(s))   POCT GLUCOSE   Result Value Ref Range    POCT Glucose 142 (H) 74 - 99 mg/dL   CBC and Auto Differential   Result Value Ref Range    WBC 11.9 (H) 4.4 - 11.3 x10*3/uL    nRBC 0.0 0.0 - 0.0 /100 WBCs    RBC 4.49 4.00 - 5.20 x10*6/uL    Hemoglobin 12.3 12.0 - 16.0 g/dL    Hematocrit 39.5 36.0 - 46.0 %    MCV 88 80 - 100 fL    MCH 27.4 26.0 - 34.0 pg    MCHC 31.1 (L) 32.0 - 36.0 g/dL    RDW 13.6 11.5 - 14.5 %    Platelets 286 150 - 450 x10*3/uL    Neutrophils % 39.7 40.0 - 80.0 %    Immature Granulocytes %, Automated 0.3 0.0 - 0.9 %    Lymphocytes % 50.4 13.0 - 44.0 %    Monocytes % 6.3 2.0 - 10.0 %    Eosinophils % 2.6 0.0 - 6.0 %    Basophils % 0.7 0.0 - 2.0 %    Neutrophils Absolute 4.71 1.20 - 7.70 x10*3/uL    Immature Granulocytes Absolute, Automated 0.03 0.00 - 0.70 x10*3/uL    Lymphocytes Absolute 5.98 (H) 1.20 - 4.80 x10*3/uL    Monocytes Absolute 0.75 0.10 - 1.00 x10*3/uL    Eosinophils Absolute 0.31 0.00 - 0.70 x10*3/uL    Basophils Absolute 0.08 0.00 - 0.10 x10*3/uL   Magnesium   Result Value Ref Range    Magnesium 1.84 1.60 - 2.40 mg/dL   Comprehensive metabolic panel   Result Value Ref Range    Glucose 151 (H) 74 - 99 mg/dL    Sodium 139 136 - 145 mmol/L    Potassium 3.5 3.5 - 5.3 mmol/L    Chloride 103 98 - 107 mmol/L    Bicarbonate 26 21 - 32 mmol/L    Anion Gap 14 10 - 20 mmol/L    Urea Nitrogen 13 6 - 23 mg/dL    Creatinine 0.71 0.50 - 1.05 mg/dL    eGFR >90 >60 mL/min/1.73m*2    Calcium 9.1 8.6 - 10.3 mg/dL    Albumin 4.0 3.4 - 5.0 g/dL    Alkaline Phosphatase 58 33 - 110 U/L    Total Protein 8.0 6.4 - 8.2 g/dL    AST 15 9 - 39 U/L    Bilirubin, Total 0.3 0.0 - 1.2 mg/dL    ALT 14 7 - 45 U/L   Lactate   Result Value Ref Range    Lactate 1.9 0.4 - 2.0 mmol/L   Lipase   Result Value Ref Range    Lipase 37 9 - 82 U/L   Creatine Kinase   Result Value Ref Range    Creatine Kinase 473 (H) 0 - 215 U/L   B-Type Natriuretic Peptide    Result Value Ref Range    BNP 15 0 - 99 pg/mL   Influenza A, and B PCR   Result Value Ref Range    Flu A Result Not Detected Not Detected    Flu B Result Not Detected Not Detected   Sars-CoV-2 PCR   Result Value Ref Range    Coronavirus 2019, PCR Not Detected Not Detected   RSV PCR   Result Value Ref Range    RSV PCR Not Detected Not Detected   Troponin I, High Sensitivity, Initial   Result Value Ref Range    Troponin I, High Sensitivity 3 0 - 13 ng/L   Troponin, High Sensitivity, 1 Hour   Result Value Ref Range    Troponin I, High Sensitivity 3 0 - 13 ng/L   '       Assessment/Plan   Principal Problem:    Weakness generalized    59-year-old female with past medical history of obesity, CHF, SVT s/p ablation on Plavix, type 2 diabetes mellitus, diabetic neuropathy, hypertension, hyperlipidemia, GERD, constipation, CARISA on CPAP, asthma, osteoarthritis, depression, fibromyalgia, PMR, chronic pain, migraines, hard of hearing, ambulatory dysfunction 2/2 pain, and anemia.  Patient presents to the hospital following an acute onset of altered mental status and loss of consciousness with associated shaking and incontinence of bowel, reported to be lethargic post episode and also complaining of some left-sided deficits/paresthesias.  Presentation suspicious for possible seizure activity and CVA.    #Suspected seizure, No seizure Hx  #Strokelike symptoms  #Elevated lactate    Telemetry monitoring  Consult neurology, appreciate input  Will obtain MRI of the brain, MRA head and neck  Echocardiogram  Check EEG  Allow for permissive hypertension  PT/OT  Bedside swallow screen, SLP evaluation  NPO, advance diet as tolerated if patient passes bedside swallow screen  Social work/TCC consult for DC planning  Continue Plavix, will start aspirin.  Patient has no obvious infectious  precipitating  etiology, follow-up UA  Continue home statin, check lipid panel        #Abdominal tenderness  #Diarrhea x 1 episode with   #History of  chronic constipation    Low suspicion for C. difficile infection or bacterial etiology, however patient has been on several courses of antibiotics recently, for this reason we will check stool studies for infection.  CT abdomen pelvis with IV contrast  Monitor  Further evaluation pending clinical course and imaging    Chronic conditions:  #Congestive heart failure, unknown systolic versus diastolic, currently no exacerbation  #SVT s/p ablation  #CARISA, continue home CPAP  #Asthma, continue home nebulizers and respiratory treatments, oxygen as needed  #Type 2 diabetes mellitus, Lantus 20 units daily (substituted for Toujeo), sliding scale insulin  #Diabetic neuropathy  #Hypertension  # Hyperlipidemia  #GERD  #Osteoarthritis  #Depression  #Fibromyalgia  #History of PMR  #Chronic pain, follows with pain management at the [facility] currently on Mobic and Flexeril.  Avoiding opioid analgesics as able  #Migraines  #Hard of hearing  #Ambulatory dysfunction  #Anemia    Continue patient's home medications as appropriate    #DVT prophylaxis  SCDs  Lovenox subcutaneous            Ignacio Del Rio, APRN-CNP

## 2024-04-21 NOTE — PROGRESS NOTES
04/21/24 1306   Discharge Planning   Living Arrangements Spouse/significant other   Support Systems Spouse/significant other;Family members   Type of Residence Private residence  (3rd floor apartment with elevator)   Home or Post Acute Services In home services   Type of Home Care Services Home OT;Home PT   Patient expects to be discharged to: Home with home health care   Patient Choice   Provider Choice list and CMS website (https://medicare.gov/care-compare#search) for post-acute Quality and Resource Measure Data were provided and reviewed with: Patient  (home health care list provided to patient on 4/21)     Met with patient at bedside, introduced self and role on care transitions team. Admission assessment completed with patient. Patient states she lives at home with her fiance. Patients fiance and daughters assist with care at home. Patient has a home health aide Sunday from through Wednesday. Patient uses power wheelchair at home and rollator walker. Demographics and insurance verified. Patient is diabetic, patient has working glucometer/supplies at home. Patient wears a cpap at night. PCP is Layne Luevano, last visit one month ago and has visiting nurse practitioner through [facility]. Spoke with patient regarding skilled nursing facility at discharge, patient has declined SNF and confirms her preference is to return home. Patient is agreeable to home health care for therapy. Patient provided with home health care list to review. Patient provided emergency contacts: Keri (daughter) [phone], Reid (daughter) [phone] and Deshawn (fiance) [phone].

## 2024-04-22 ENCOUNTER — APPOINTMENT (OUTPATIENT)
Dept: NEUROLOGY | Facility: HOSPITAL | Age: 59
End: 2024-04-22
Payer: COMMERCIAL

## 2024-04-22 ENCOUNTER — APPOINTMENT (OUTPATIENT)
Dept: CARDIOLOGY | Facility: HOSPITAL | Age: 59
End: 2024-04-22
Payer: COMMERCIAL

## 2024-04-22 ENCOUNTER — APPOINTMENT (OUTPATIENT)
Dept: RADIOLOGY | Facility: HOSPITAL | Age: 59
End: 2024-04-22
Payer: COMMERCIAL

## 2024-04-22 LAB
ANION GAP SERPL CALC-SCNC: 10 MMOL/L (ref 10–20)
AORTIC VALVE MEAN GRADIENT: 3 MMHG
AORTIC VALVE PEAK VELOCITY: 1.17 M/S
AV PEAK GRADIENT: 5.5 MMHG
AVA (PEAK VEL): 2.83 CM2
AVA (VTI): 3.04 CM2
BUN SERPL-MCNC: 9 MG/DL (ref 6–23)
CALCIUM SERPL-MCNC: 8.6 MG/DL (ref 8.6–10.3)
CARDIAC TROPONIN I PNL SERPL HS: 4 NG/L (ref 0–13)
CHLORIDE SERPL-SCNC: 107 MMOL/L (ref 98–107)
CO2 SERPL-SCNC: 27 MMOL/L (ref 21–32)
CREAT SERPL-MCNC: 0.5 MG/DL (ref 0.5–1.05)
EGFRCR SERPLBLD CKD-EPI 2021: >90 ML/MIN/1.73M*2
EJECTION FRACTION APICAL 4 CHAMBER: 56.8
ERYTHROCYTE [DISTWIDTH] IN BLOOD BY AUTOMATED COUNT: 13.9 % (ref 11.5–14.5)
GLUCOSE BLD MANUAL STRIP-MCNC: 100 MG/DL (ref 74–99)
GLUCOSE BLD MANUAL STRIP-MCNC: 109 MG/DL (ref 74–99)
GLUCOSE BLD MANUAL STRIP-MCNC: 125 MG/DL (ref 74–99)
GLUCOSE BLD MANUAL STRIP-MCNC: 125 MG/DL (ref 74–99)
GLUCOSE BLD MANUAL STRIP-MCNC: 137 MG/DL (ref 74–99)
GLUCOSE SERPL-MCNC: 97 MG/DL (ref 74–99)
HCT VFR BLD AUTO: 37.1 % (ref 36–46)
HGB BLD-MCNC: 11 G/DL (ref 12–16)
LEFT ATRIUM VOLUME AREA LENGTH INDEX BSA: 22.8 ML/M2
LEFT VENTRICLE INTERNAL DIMENSION DIASTOLE: 5.06 CM (ref 3.5–6)
LEFT VENTRICULAR OUTFLOW TRACT DIAMETER: 2.3 CM
LV EJECTION FRACTION BIPLANE: 60 %
MCH RBC QN AUTO: 26.8 PG (ref 26–34)
MCHC RBC AUTO-ENTMCNC: 29.6 G/DL (ref 32–36)
MCV RBC AUTO: 91 FL (ref 80–100)
MITRAL VALVE E/A RATIO: 0.97
NRBC BLD-RTO: 0 /100 WBCS (ref 0–0)
PLATELET # BLD AUTO: 270 X10*3/UL (ref 150–450)
POTASSIUM SERPL-SCNC: 3.7 MMOL/L (ref 3.5–5.3)
RBC # BLD AUTO: 4.1 X10*6/UL (ref 4–5.2)
RIGHT VENTRICLE FREE WALL PEAK S': 15.4 CM/S
SODIUM SERPL-SCNC: 140 MMOL/L (ref 136–145)
TRICUSPID ANNULAR PLANE SYSTOLIC EXCURSION: 2.5 CM
WBC # BLD AUTO: 7 X10*3/UL (ref 4.4–11.3)

## 2024-04-22 PROCEDURE — 70551 MRI BRAIN STEM W/O DYE: CPT

## 2024-04-22 PROCEDURE — 85027 COMPLETE CBC AUTOMATED: CPT | Performed by: INTERNAL MEDICINE

## 2024-04-22 PROCEDURE — 72148 MRI LUMBAR SPINE W/O DYE: CPT | Performed by: RADIOLOGY

## 2024-04-22 PROCEDURE — 70544 MR ANGIOGRAPHY HEAD W/O DYE: CPT | Mod: 59

## 2024-04-22 PROCEDURE — 36415 COLL VENOUS BLD VENIPUNCTURE: CPT

## 2024-04-22 PROCEDURE — 2500000001 HC RX 250 WO HCPCS SELF ADMINISTERED DRUGS (ALT 637 FOR MEDICARE OP): Performed by: NURSE PRACTITIONER

## 2024-04-22 PROCEDURE — 2500000006 HC RX 250 W HCPCS SELF ADMINISTERED DRUGS (ALT 637 FOR ALL PAYERS): Mod: MUE | Performed by: NURSE PRACTITIONER

## 2024-04-22 PROCEDURE — 70547 MR ANGIOGRAPHY NECK W/O DYE: CPT | Performed by: RADIOLOGY

## 2024-04-22 PROCEDURE — 36415 COLL VENOUS BLD VENIPUNCTURE: CPT | Performed by: INTERNAL MEDICINE

## 2024-04-22 PROCEDURE — 82947 ASSAY GLUCOSE BLOOD QUANT: CPT | Mod: 59

## 2024-04-22 PROCEDURE — 93306 TTE W/DOPPLER COMPLETE: CPT

## 2024-04-22 PROCEDURE — 99232 SBSQ HOSP IP/OBS MODERATE 35: CPT | Performed by: NURSE PRACTITIONER

## 2024-04-22 PROCEDURE — 80048 BASIC METABOLIC PNL TOTAL CA: CPT | Performed by: INTERNAL MEDICINE

## 2024-04-22 PROCEDURE — 84484 ASSAY OF TROPONIN QUANT: CPT

## 2024-04-22 PROCEDURE — 70547 MR ANGIOGRAPHY NECK W/O DYE: CPT | Mod: 59

## 2024-04-22 PROCEDURE — 70551 MRI BRAIN STEM W/O DYE: CPT | Performed by: RADIOLOGY

## 2024-04-22 PROCEDURE — 99231 SBSQ HOSP IP/OBS SF/LOW 25: CPT | Performed by: INTERNAL MEDICINE

## 2024-04-22 PROCEDURE — 93306 TTE W/DOPPLER COMPLETE: CPT | Performed by: INTERNAL MEDICINE

## 2024-04-22 PROCEDURE — 96372 THER/PROPH/DIAG INJ SC/IM: CPT | Performed by: NURSE PRACTITIONER

## 2024-04-22 PROCEDURE — 2500000006 HC RX 250 W HCPCS SELF ADMINISTERED DRUGS (ALT 637 FOR ALL PAYERS): Performed by: NURSE PRACTITIONER

## 2024-04-22 PROCEDURE — 70544 MR ANGIOGRAPHY HEAD W/O DYE: CPT | Performed by: RADIOLOGY

## 2024-04-22 PROCEDURE — 72148 MRI LUMBAR SPINE W/O DYE: CPT

## 2024-04-22 PROCEDURE — 97535 SELF CARE MNGMENT TRAINING: CPT | Mod: CO,GO

## 2024-04-22 PROCEDURE — G0378 HOSPITAL OBSERVATION PER HR: HCPCS

## 2024-04-22 PROCEDURE — 99232 SBSQ HOSP IP/OBS MODERATE 35: CPT | Performed by: INTERNAL MEDICINE

## 2024-04-22 PROCEDURE — 93010 ELECTROCARDIOGRAM REPORT: CPT | Performed by: STUDENT IN AN ORGANIZED HEALTH CARE EDUCATION/TRAINING PROGRAM

## 2024-04-22 PROCEDURE — 93005 ELECTROCARDIOGRAM TRACING: CPT

## 2024-04-22 PROCEDURE — 92610 EVALUATE SWALLOWING FUNCTION: CPT | Mod: GN

## 2024-04-22 PROCEDURE — 97116 GAIT TRAINING THERAPY: CPT | Mod: GP

## 2024-04-22 PROCEDURE — 2500000002 HC RX 250 W HCPCS SELF ADMINISTERED DRUGS (ALT 637 FOR MEDICARE OP, ALT 636 FOR OP/ED): Performed by: NURSE PRACTITIONER

## 2024-04-22 PROCEDURE — 2500000004 HC RX 250 GENERAL PHARMACY W/ HCPCS (ALT 636 FOR OP/ED): Performed by: NURSE PRACTITIONER

## 2024-04-22 RX ORDER — NYSTATIN 100000 [USP'U]/G
1 POWDER TOPICAL 2 TIMES DAILY
Status: DISCONTINUED | OUTPATIENT
Start: 2024-04-22 | End: 2024-04-23 | Stop reason: HOSPADM

## 2024-04-22 RX ADMIN — ACETAMINOPHEN 975 MG: 325 TABLET ORAL at 20:26

## 2024-04-22 RX ADMIN — ASPIRIN 81 MG: 81 TABLET, COATED ORAL at 10:30

## 2024-04-22 RX ADMIN — INSULIN GLARGINE 20 UNITS: 100 INJECTION, SOLUTION SUBCUTANEOUS at 20:25

## 2024-04-22 RX ADMIN — ROSUVASTATIN CALCIUM 20 MG: 10 TABLET, FILM COATED ORAL at 20:27

## 2024-04-22 RX ADMIN — MELOXICAM 7.5 MG: 7.5 TABLET ORAL at 14:45

## 2024-04-22 RX ADMIN — ENOXAPARIN SODIUM 40 MG: 40 INJECTION SUBCUTANEOUS at 20:26

## 2024-04-22 RX ADMIN — METOPROLOL SUCCINATE 50 MG: 50 TABLET, EXTENDED RELEASE ORAL at 10:30

## 2024-04-22 RX ADMIN — ACETAMINOPHEN 975 MG: 325 TABLET ORAL at 06:13

## 2024-04-22 RX ADMIN — ENOXAPARIN SODIUM 40 MG: 40 INJECTION SUBCUTANEOUS at 10:30

## 2024-04-22 RX ADMIN — NYSTATIN 1 APPLICATION: 100000 POWDER TOPICAL at 23:28

## 2024-04-22 ASSESSMENT — COGNITIVE AND FUNCTIONAL STATUS - GENERAL
STANDING UP FROM CHAIR USING ARMS: A LOT
MOBILITY SCORE: 18
DRESSING REGULAR LOWER BODY CLOTHING: A LOT
TOILETING: A LOT
STANDING UP FROM CHAIR USING ARMS: A LITTLE
DRESSING REGULAR LOWER BODY CLOTHING: A LOT
DRESSING REGULAR UPPER BODY CLOTHING: A LOT
MOBILITY SCORE: 15
TOILETING: A LOT
MOVING TO AND FROM BED TO CHAIR: A LITTLE
HELP NEEDED FOR BATHING: A LOT
MOBILITY SCORE: 17
CLIMB 3 TO 5 STEPS WITH RAILING: TOTAL
MOVING TO AND FROM BED TO CHAIR: A LITTLE
HELP NEEDED FOR BATHING: A LOT
WALKING IN HOSPITAL ROOM: A LITTLE
DAILY ACTIVITIY SCORE: 24
STANDING UP FROM CHAIR USING ARMS: A LITTLE
CLIMB 3 TO 5 STEPS WITH RAILING: TOTAL
CLIMB 3 TO 5 STEPS WITH RAILING: TOTAL
WALKING IN HOSPITAL ROOM: A LOT
DRESSING REGULAR UPPER BODY CLOTHING: A LOT
WALKING IN HOSPITAL ROOM: A LOT
DAILY ACTIVITIY SCORE: 16
DAILY ACTIVITIY SCORE: 16
MOVING TO AND FROM BED TO CHAIR: A LOT

## 2024-04-22 ASSESSMENT — PAIN DESCRIPTION - LOCATION: LOCATION: LEG

## 2024-04-22 ASSESSMENT — PAIN DESCRIPTION - ORIENTATION: ORIENTATION: LEFT

## 2024-04-22 ASSESSMENT — PAIN - FUNCTIONAL ASSESSMENT
PAIN_FUNCTIONAL_ASSESSMENT: 0-10
PAIN_FUNCTIONAL_ASSESSMENT: 0-10

## 2024-04-22 ASSESSMENT — PAIN SCALES - GENERAL
PAINLEVEL_OUTOF10: 0 - NO PAIN
PAINLEVEL_OUTOF10: 3
PAINLEVEL_OUTOF10: 6
PAINLEVEL_OUTOF10: 6

## 2024-04-22 ASSESSMENT — ACTIVITIES OF DAILY LIVING (ADL): HOME_MANAGEMENT_TIME_ENTRY: 15

## 2024-04-22 NOTE — PROGRESS NOTES
Physical Therapy    Physical Therapy Treatment    Patient Name: Liyah Márquez  MRN: [id number]  Today's Date: 4/22/2024  Time Calculation  Start Time: 1351  Stop Time: 1415  Time Calculation (min): 24 min       Assessment/Plan   PT Assessment  End of Session Patient Position: Up in chair, Alarm on     PT Plan  Treatment/Interventions: Bed mobility, Transfer training, Gait training, Balance training, Neuromuscular re-education, Strengthening, Endurance training, Therapeutic exercise, Therapeutic activity  PT Plan: Skilled PT  PT Frequency: 4 times per week  PT Discharge Recommendations: Moderate intensity level of continued care  PT - OK to Discharge: Yes      General Visit Information:   PT  Visit  PT Received On: 04/22/24  General  Prior to Session Communication: Bedside nurse    Subjective   Precautions:     Vital Signs:       Objective   Pain:     Cognition:     Postural Control:     Extremity/Trunk Assessments:     Activity Tolerance:     Treatments:  Bed Mobility  Bed Mobility:  (supine to sit w/ hob up, bed rails up w/ supervision, per pt she uses hospital bed at home)    Ambulation/Gait Training  Ambulation/Gait Training Performed:  (ambs w/ rw 15ft w/ cga of 1 w rest break sitting eob, ambs 5ft bed  to chair w/ rw and cga of 1, no lob)  Transfers  Transfer:  (on/off bed w/ cga of 1, stand to chair w/ cga of 1)    Outcome Measures:  [facility] Basic Mobility  Turning from your back to your side while in a flat bed without using bedrails: None  Moving from lying on your back to sitting on the side of a flat bed without using bedrails: None  Moving to and from bed to chair (including a wheelchair): A little  Standing up from a chair using your arms (e.g. wheelchair or bedside chair): A little  To walk in hospital room: A little  Climbing 3-5 steps with railing: Total  Basic Mobility - Total Score: 18    Education Documentation  No documentation found.  Education Comments  No comments found.        OP EDUCATION:        Encounter Problems       Encounter Problems (Active)       PT Problem       STG - Pt will transition supine <> sitting with SBA (Progressing)       Start:  04/21/24    Expected End:  05/05/24   achieved          STG - Pt will transfer STS with CGA (Progressing)       Start:  04/21/24    Expected End:  05/05/24   achieved          STG - Pt will amb 25' using WW with minAx1 (Progressing)       Start:  04/21/24    Expected End:  05/05/24            STG - Pt will maintain F+ dynamic standing balance to decrease risk of falls (Progressing)       Start:  04/21/24    Expected End:  05/05/24            STG - Pt will perform 2-3 sets of BLE therex x10 to maximize functional strength and independence  (Progressing)       Start:  04/21/24    Expected End:  05/05/24

## 2024-04-22 NOTE — CARE PLAN
The patient's goals for the shift include comfort and safety.    The clinical goals for the shift include patient will remain safe and free from injury for entire shift.

## 2024-04-22 NOTE — PROGRESS NOTES
Speech-Language Pathology    SLP Adult Inpatient Speech-Language Pathology Clinical Swallow Evaluation    Patient Name: Liyah Márquez  MRN: [id number]  Today's Date: 4/22/2024   Time Calculation  Start Time: 1300  Stop Time: 1330  Time Calculation (min): 30 min         Current Problem:   1. Generalized weakness        2. Cerebrovascular accident (CVA), unspecified mechanism (Multi)        3. TIA (transient ischemic attack)  Transthoracic Echo (TTE) Complete    Transthoracic Echo (TTE) Complete      4. Other transient cerebral ischemic attacks and related syndromes  Transthoracic Echo (TTE) Complete      5. Syncope, unspecified syncope type  Holter Or Event Cardiac Monitor    Holter Or Event Cardiac Monitor        Assessment:  -Intact oral phase of the swallow for chosen soft food item, and suspected functional pharyngeal swallow given clinical bedside indicators.   Pt is managing secretions, tongue protrudes to midline, no noticeable facial droop. Adequate laryngeal rise palpated.  -PO trials straw sips of thin water and soft solid.   -ORAL PHASE: labial seal intact on straw. There is no anterior loss of the oral bolus.  Mastication lengthy as a result of limited dentition; patient knows to choose foods that are naturally soft for ease of mastication or she cuts harder foods into smaller pieces and utilizes liquid wash as needed; oral bolus formation and manipulation WFL. No oral pocketing. No significant oral residue after the swallow.   -PHARYNGEAL PHASE; no overt s/s aspiration with all consistencies. No change in vocal quality or respirations with PO intake. Patient is on room air and does not appear to be in any respiratory distress.     Recommendations:  Solid Diet Recommendations : Regular (IDDSI Level 7);   patient to choose appropriate items from menu that she knows she can chew and swallow. Encouraged patient to continue to choose menu items that are naturally pureed/soft for ease of  mastication/swallowing.    *Discussed that we could modify her diet textures via puree or minced + moist for ease of intake, however, patient declined.*    Liquid Diet Recommendations: Thin (IDDSI Level 0); straw sips ok    Compensatory Swallowing Strategies:   Upright 90 degrees as possible for all oral intake,   Remain upright for 20-30 minutes after meals,   Alternate solids and liquids,   Small bites/sips,   Eat/feed slowly    Medication Administration Recommendations: Whole, With Liquid, With Pureed; patient reports at home that she occasionally will take pills whole in puree carrier but can take them with sips of liquid as well. RN to closely monitor and provide medication as needed/appropriate.      Plan:  SLP Plan: No skilled SLP at this time; encouraged patient to notify MD of any new changes in her swallowing ability. MBS can always be completed on an outpatient basis, as needed. At this time, MBS does not appear clinically warranted.      Subjective   Current Problem:  Patient with history of swallowing issues      General Visit Information:  Patient Class: Inpatient  Living Environment: Home  Ordering Physician: BOYD Del Rio  Past Medical History Relevant to Rehab: Patient brought to [facility] after becoming unresponsive, lethargic and weak at home. Some slurred speech, incontinence and confusion were also noted. Patient with c/o of left sided numbness/tingling. Concern for possible syncope vs seizure activity vs dehydration vs viral/infectious etiology.    Past hx: GERD, fibromyalgia, obesity, diabetes, CHF, constipation, arthritis, depression, migraine, CARISA, HTN, dysphagia, polymyositis, anxiety, hearing loss.    Patient had CP myotomy in July 2020 that was complicated by a leak, followed by I+D of neck with local strap muscle advancement flap and again complicated by post-op hematoma s/p drainage.    Patient has had MBSs in the past; most recently completed at [facility] on 1/12/24. No aspiration was viewed.  "Complete pharyngeal and esophageal clearance noted. Recommended dental soft/thin liquids with use of alternating liquids/solids, slow rate, upright positioning for all PO intake and self-monitoring.    Patient has had multiple esophagrams in the past; most recent on 1/7/22 showing \"Mural irregularity of the posterior hypopharyngeal/proximal cervical  esophageal wall with associated mild narrowing and transient delay of 13  mm barium tablet, with reproduction of reported symptoms.\"       Vital Signs:   Room air, afebrile, Brain MRI and CXR negative for any acute findings.      Objective       Baseline Assessment:  Respiratory Status: Room air      Pain:  Pain Score: 0 - No pain       Oral/Motor Assessment:  Dentition: Some Missing Teeth; patient has had dentures in the past but has accidentally thrown two sets away and now she is unable to use denture adhesive d/t allergic reaction. Patient reports insurance will not cover dental implants.  Oral Motor: Within Functional Limits      Clinical Observations:  Patient Positioning: Upright in Bed  Management of Oral Secretions: Adequate      Inpatient:  Education Comments  Results/recommendations were d/w patient. Encouraged patient to continue utilizing the compensatory strategies that she is using to assist in optimum oral/pharyngeal transit of food. Clinician and patient discussed various management techniques such as texture modification, however, patient declined at this time. Reports that she is able to manage picking and choosing foods that she can chew/mash appropriately.      Consultations/Referrals/Coordination of Services:   Consider GI consult for possible esophageal issues given reports of acid reflux (patient with hx of GERD) and mild narrowing seen on esophagram in 2022.  Ongoing dietician recommendations to manage adequate caloric intake and nutrition/hydration needs given various restrictions d/t diabetes, food allergies and swallowing issues.  Patient's " speech/language skills are WFL with no evidence of any dysarthria or aphasic errors during conversational speech. Patient is able to answer questions appropriately and follow commands. Formal speech/language/cognitive evaluation is not warranted at this time.

## 2024-04-22 NOTE — PROGRESS NOTES
Occupational Therapy    OT Treatment    Patient Name: Liyah Márquez  MRN: [id number]  Today's Date: 4/22/2024  Time Calculation  Start Time: 1352  Stop Time: 1415  Time Calculation (min): 23 min         Assessment:  End of Session Patient Position: Up in chair, Alarm on     Plan:  Treatment Interventions: ADL retraining, Functional transfer training, UE strengthening/ROM, Endurance training, Patient/family training  OT Frequency: 5 times per week  OT Discharge Recommendations: Moderate intensity level of continued care  Treatment Interventions: ADL retraining, Functional transfer training, UE strengthening/ROM, Endurance training, Patient/family training    Subjective   Previous Visit Info:  OT Last Visit  OT Received On: 04/22/24  General:  General  Co-Treatment: PT  Co-Treatment Reason:  (increase safety and maximize mobility)  Prior to Session Communication: Bedside nurse  General Comment: pleasant and cooperative, in agreement to therpay session  Precautions:  Medical Precautions: Fall precautions         Objective         Activities of Daily Living: UE Dressing  UE Dressing Level of Assistance: Moderate assistance  UE Dressing Where Assessed: Edge of bed  UE Dressing Comments: don gown over back    LE Dressing  LE Dressing: Yes  Adult Briefs Level of Assistance: Minimum assistance  LE Dressing Where Assessed: Edge of bed  LE Dressing Comments: don undergarments  Functional Standing Tolerance:  Time: 3:00 standing at FWW  Bed Mobility/Transfers: Bed Mobility  Bed Mobility: Yes  Bed Mobility 1  Bed Mobility 1: Supine to sitting  Level of Assistance 1: Close supervision  Bed Mobility Comments 1: with use of bed rail, pt states one has hospital bed at home    Transfers  Transfer: Yes  Transfer 1  Trials/Comments 1: CGA for STS from EOB to FWW x3 trials with min vc for hand placement and sequencing      Functional Mobility:  Functional Mobility  Functional Mobility Performed: Yes  Functional Mobility 1  Device 1:  Rolling walker  Functional Mobility Support Devices: Gait belt  Assistance 1: Contact guard  Comments 1: pt able to completed fx mobility x2 trials at [facility] with mod vc for increased safety. pt able to ambulate to window and back to bed and from bed to chair with arms      Outcome Measures:[facility] Daily Activity  Putting on and taking off regular lower body clothing: A lot  Bathing (including washing, rinsing, drying): A lot  Putting on and taking off regular upper body clothing: A lot  Toileting, which includes using toilet, bedpan or urinal: A lot  Taking care of personal grooming such as brushing teeth: None  Eating Meals: None  Daily Activity - Total Score: 16        Education Documentation  No documentation found.  Education Comments  No comments found.               Goals:  Encounter Problems       Encounter Problems (Active)       ADLs       Patient with complete lower body dressing with supervision level of assistance donning and doffing all LE clothes  with PRN adaptive equipment while edge of bed  (Progressing)       Start:  04/21/24    Expected End:  05/05/24            Patient will complete toileting including hygiene clothing management/hygiene with modified independent level of assistance and raised toilet seat and grab bars. (Progressing)       Start:  04/21/24    Expected End:  05/05/24               EXERCISE/STRENGTHENING       Patient will complete BUE exercises for  in order to improve strength and activity for ADL performance.  (Progressing)       Start:  04/21/24    Expected End:  05/05/24               MOBILITY       Patient will perform Functional mobility min Household distances/Community Distances with modified independent level of assistance and least restrictive device, front wheeled walker, and power wheelchair in order to improve functional mobility. (Progressing)       Start:  04/21/24    Expected End:  05/05/24               TRANSFERS       Patient will perform bed mobility modified  independent level of assistance and bed rails in order to improve safety and independence with mobility (Progressing)       Start:  04/21/24    Expected End:  05/05/24            Patient will complete sit to stand transfer with independent level of assistance and least restrictive device in order to improve safety and prepare for out of bed mobility. (Progressing)       Start:  04/21/24    Expected End:  05/05/24

## 2024-04-22 NOTE — PROGRESS NOTES
Physical Therapy    Physical Therapy Treatment    Patient Name: Liyah Márquez  MRN: [id number]  Today's Date: 4/22/2024  Time Calculation  Start Time: 1351  Stop Time: 1415  Time Calculation (min): 24 min       Assessment/Plan   PT Assessment  End of Session Patient Position: Up in chair, Alarm on     PT Plan  Treatment/Interventions: Bed mobility, Transfer training, Gait training, Balance training, Neuromuscular re-education, Strengthening, Endurance training, Therapeutic exercise, Therapeutic activity  PT Plan: Skilled PT  PT Frequency: 4 times per week  PT Discharge Recommendations: Moderate intensity level of continued care  PT - OK to Discharge: Yes      General Visit Information:   PT  Visit  PT Received On: 04/22/24  General  Prior to Session Communication: Bedside nurse    Subjective   Precautions:     Vital Signs:       Objective   Pain:     Cognition:     Postural Control:     Extremity/Trunk Assessments:        Activity Tolerance:     Treatments:  Bed Mobility  Bed Mobility:  (supine to sit w/ hob up, bed rails up w/ supervision, per pt she uses hospital bed at home)    Ambulation/Gait Training  Ambulation/Gait Training Performed:  (ambs w/ rw 15ft w/ cga of 1 w rest break sitting eob, ambs 5ft bed  to chair w/ rw and cga of 1, no lob)  Transfers  Transfer:  (on/off bed w/ cga of 1, stand to chair w/ cga of 1)    Outcome Measures:  [facility] Basic Mobility  Turning from your back to your side while in a flat bed without using bedrails: None  Moving from lying on your back to sitting on the side of a flat bed without using bedrails: None  Moving to and from bed to chair (including a wheelchair): A little  Standing up from a chair using your arms (e.g. wheelchair or bedside chair): A little  To walk in hospital room: A little  Climbing 3-5 steps with railing: Total  Basic Mobility - Total Score: 18    Education Documentation  No documentation found.  Education Comments  No comments found.        OP EDUCATION:        Encounter Problems       Encounter Problems (Active)       PT Problem       STG - Pt will amb 25' using WW with minAx1 (Progressing)       Start:  04/21/24    Expected End:  05/05/24            STG - Pt will maintain F+ dynamic standing balance to decrease risk of falls (Progressing)       Start:  04/21/24    Expected End:  05/05/24            STG - Pt will perform 2-3 sets of BLE therex x10 to maximize functional strength and independence  (Progressing)       Start:  04/21/24    Expected End:  05/05/24               PT Problem       PT Goal 1 (Progressing)       Start:  04/22/24    Expected End:  05/06/24       Indep bed mob          PT Goal 2 (Progressing)       Start:  04/22/24    Expected End:  05/06/24       Indep txs

## 2024-04-22 NOTE — NURSING NOTE
04/22/24 0928 Patient Navigator    I introduced myself to the patient and explained my role. Pt states she lives with family and assist her as needed along with home health aides. She was diagnosed with diabetes approximately 5 years ago. She takes Ozempic 1 one injection weekly, Toujeo daily, & Jardiance daily. She monitors her blood sugar daily with a glucometer. I stressed the importance of having her eyes examined yearly with pupil dilation. In addition, I informed her of the importance of having her feet examined by a health professional.   She uses an electric wheelchair at home along with a walker. I did informed her of the recommendations of 30 minutes 3 times a week of exercising. Pt states she attempts to eat a healthy diet. I reviewed the handouts listed below, the Stroke Folder, and answered her questions. I reviewed the following lab values and what they indicate: cholesterol, HDL, LDL, triglycerides, and A1C. I gave the patient my contact information & instructed her to call me as needed. She appreciated my visit and the information I provided. I have updated the patient's RN of my visit.    Handouts:  What can I eat? American Diabetes Association  The connection between diabetes & stroke- American Stroke Association   Planning healthy meals- Rodger Nordisk  How do I follow a healthy diet pattern? American Heart Association     Lawanda CHAPIN, RN  Patient Navigator  Stroke Educator  Diabetes Care &

## 2024-04-22 NOTE — SIGNIFICANT EVENT
"Pt called for nurse complaining that her \"heart was beating too fast \"   I went to go check on pt and obtained vitals ..   Vitals WNL and HR at 86 in NSR on monitor .     Pt shortly after started c/o chest pressure that started in the middle of her chest and started coming and going also pt started c/o SOB .     I then called for RRT due to the pts c/o of chest pressure that wasn't going and SOB.     RRT came and evaluated pt .   "

## 2024-04-22 NOTE — SIGNIFICANT EVENT
Rapid response was called for room 314 at 1618.  Upon my arrival to the rapid response it was relayed to me that the patient had begun complaining of chest palpitations/pressure.  RN stated that she checked telemetry and the patient was in normal sinus rhythm.  Patient was complaining to me of constant pressure in the center of her chest.  She stated that the pressure did not radiate to her back arm or neck.  She also mentioned feeling short of breath.    Vitals: 136/82 HR 86 T 97.7 o2 98% RA     GEN: resting in bed, awake, alert  NECK: supple  CV: S1, S2, RRR  PULM: CTAB  ABD: soft, NT, ND  NEURO: no new gross focal deficits  EXT: no sig LE edema  PSYCH: appropriate affect    Assessment/Plan:  #Chest pain, suspect MSK  -ECG was obtained during the rapid, showed normal sinus rhythm  -Patient's chest pain was reproducible upon palpation  -She was not requiring any supplemental O2  -Continue monitoring on telemetry  -Troponin pending    Sammy Vela DO, PGY-1  Internal Medicine

## 2024-04-22 NOTE — H&P
History Of Present Illness  Liyah Márquez is a 59-year-old female with past medical history of obesity, CHF, SVT s/p ablation on Plavix, type 2 diabetes mellitus, diabetic neuropathy, hypertension, hyperlipidemia, GERD, constipation, CARISA on CPAP, asthma, osteoarthritis, depression, fibromyalgia, PMR, chronic pain, migraines, hard of hearing, ambulatory dysfunction 2/2 pain, and anemia.  Patient presents to the hospital following an acute onset of altered mental status and loss of consciousness at home.  Episode occurred around 3:30 PM this afternoon.  Daughter is at bedside, she was told by another family member abruptly became unresponsive at home while sitting on the couch, they report that she had little appeared to be shaking and had incontinence of bowel (describes liquid stool as nonbloody), possibly incontinence of urine however patient is unsure.  She does not have good recollection of the event.  Daughter reports that patient following event was acting confused, lethargic, and weak.  Her speech was slurred.  Patient also reports that she had weakness on the left side of the body with tingling in her feet and hands on the left side.  Felt as though she had occipital head pressure and felt off balance.  Symptoms have improved, currently having continued decreased sensation in the left lower extremity (unclear if due to peripheral neuropathy) and drift in left lower extremity.  Patient does endorse recent history of increased shortness of breath and rapid heart rate with activity.  She is not ambulatory at baseline and is up to wheelchair with 1-2 assist.  She has a home health aide acute to go home Sunday through Wednesday from 9a-2p.  Recent medication changes include starting Ozempic approximately 2 months ago, current dose 1 mg weekly.  She did have GI symptoms early on but they have improved.  Reports slight loss of 42 pounds, but states she has regained 6 pounds.  Reports recent history of Cipro for  bacterial vaginosis, followed by a course of doxycycline for skin lesions.  Patient normally has infrequent bowel movements, usually once weekly, recently started on lactulose, but feels gassy.  She does endorse abdominal tenderness, possible gas pains right upper quadrant and reports acid reflux/regurgitation.  Denies fevers, chills, sweats, vision or hearing changes, chest pains, cough, wheezing, nausea, vomiting, dysuria, or open wounds.     ER course: Hypertensive up to 197/84 but otherwise hemodynamically stable, afebrile, SpO2 100% on room air.  EKG per ER provider interpretation, sinus rhythm, ventricular rate 83, , QRS 88, Qtc 451. No STEMI.  WBC 11.9 otherwise CBC unremarkable.  Glucose 151, CMP normal otherwise.  Magnesium 1.84, lactate 1.9, BNP 15, lipase 37, high-sensitivity troponin 3, repeat 3.  . CXR no acute cardiopulmonary disease.  CT head was unremarkable.  Patient received morphine 4 mg and LR 1 L.  UA pending collection.    Past medical history: As above  Past surgical history: As above: Additional history of right Achilles tendon repair, left wrist bone excision, right wrist tendon repair, hysterectomy, tubal ligation, dental extractions, multiple skin lesions removed from her back, benign breast lumps removed  Family history: COPD, diabetes, hypertension, arthritis, pancreatic cancer (father  age 42), CAD, CVA, breast cancer  Social history: Former smoker-> cigars ~ 2 years, quit date .  No illicit drug use.  No alcohol abuse.  Wheelchair-bound, lives at home with her fiancé.  Has a home health aide that visits  through Wednesday from 9 AM to 2 PM.            Past Medical History  History reviewed. No pertinent past medical history.    Surgical History  History reviewed. No pertinent surgical history.     Social History  She reports that she has never smoked. She has never been exposed to tobacco smoke. She has never used smokeless tobacco. She reports that she does  not currently use alcohol. She reports that she does not use drugs.    Family History  No family history on file.     Allergies  Haptens - plastic and glue series, Adhesive tape-silicones, Carrot, Denture care products, Tizanidine, Aspartame, Latex, Nickel, and Sucralose    Review of Systems     Physical Exam  Constitutional:       General: She is awake. She is not in acute distress.     Appearance: Normal appearance. She is not toxic-appearing.   HENT:      Head: Atraumatic.      Nose: Nose normal.      Mouth/Throat:      Mouth: Mucous membranes are moist.   Eyes:      General: No visual field deficit.     Extraocular Movements: Extraocular movements intact.      Conjunctiva/sclera: Conjunctivae normal.      Pupils: Pupils are equal, round, and reactive to light.   Cardiovascular:      Rate and Rhythm: Normal rate and regular rhythm.      Pulses: Normal pulses.      Heart sounds: No murmur heard.  Pulmonary:      Effort: Pulmonary effort is normal. No respiratory distress.      Breath sounds: Normal breath sounds.   Abdominal:      General: Bowel sounds are normal. There is no distension.      Palpations: Abdomen is soft.      Tenderness: There is abdominal tenderness. There is no guarding.   Musculoskeletal:         General: No swelling, deformity or signs of injury. Normal range of motion.      Cervical back: Neck supple.      Right lower leg: No edema.      Left lower leg: No edema.   Skin:     General: Skin is warm and dry.      Capillary Refill: Capillary refill takes less than 2 seconds.      Findings: No ecchymosis, erythema or wound.   Neurological:      General: No focal deficit present.      Mental Status: She is alert and oriented to person, place, and time.      Cranial Nerves: No cranial nerve deficit, dysarthria or facial asymmetry.      Motor: Weakness present.      Coordination: Coordination normal. Heel to Shin Test abnormal (unable to perform at baseline). Finger-Nose-Finger Test normal.  "  Psychiatric:         Mood and Affect: Mood normal.         Behavior: Behavior is cooperative.          Last Recorded Vitals  Blood pressure 134/70, pulse 70, temperature 35.5 °C (95.9 °F), resp. rate 16, height 1.803 m (5' 11\"), weight 136 kg (300 lb), SpO2 99%.    Relevant Results      XR chest 1 view    Result Date: 4/20/2024  Interpreted By:  Hao Martínez, STUDY: XR CHEST 1 VIEW;  4/20/2024 4:59 pm   INDICATION: Signs/Symptoms:generalized weakness.   COMPARISON: None.   ACCESSION NUMBER(S): [id number]   ORDERING CLINICIAN: ROYA HONG   FINDINGS: CARDIOMEDIASTINAL SILHOUETTE AND VASCULATURE:   Cardiac size:  Within normal limits. Aortic shadow:  Within normal limits.   Mediastinal contours: Within normal limits.   Pulmonary vasculature:  The central vasculature is unremarkable   LUNGS: Lungs are clear.   ABDOMEN AND OTHER FINDINGS: No remarkable upper abdominal findings.   BONES: No acute osseous changes.       1.  No active cardiopulmonary disease.  There has not been significant interval change from the prior exam.   Signed by: Hao Martínez 4/20/2024 5:25 PM Dictation workstation:   [id number]    CT head wo IV contrast    Result Date: 4/20/2024  Interpreted By:  Hao Martínez, STUDY: CT HEAD WO IV CONTRAST;  4/20/2024 4:56 pm   INDICATION: Signs/Symptoms:generalized weakness.   COMPARISON: 01/19/2016   ACCESSION NUMBER(S): [id number]   ORDERING CLINICIAN: ROYA HONG   TECHNIQUE: Sequential trans axial images were obtained  .   FINDINGS: INTRACRANIAL:   CORTICAL SULCI AND EXTRA-AXIAL SPACES:  Unremarkable.   VENTRICULAR SYSTEM:  Unremarkable without significant dilatation.   CEREBRAL PARENCHYMA:  Unremarkable without significant degenerative change.There is no evidence of definite subacute infarction, intracranial hemorrhage or mass.   EXTRACRANIAL: Visualized paranasal sinuses and mastoids are clear. The calvarium is intact.       Unremarkable exam. There has not been significant interval change from the " prior exam.   Signed by: Hao Martínez 4/20/2024 5:24 PM Dictation workstation:   [id number]     Results for orders placed or performed during the hospital encounter of 04/20/24 (from the past 24 hour(s))   POCT GLUCOSE   Result Value Ref Range    POCT Glucose 103 (H) 74 - 99 mg/dL   Protime-INR   Result Value Ref Range    Protime 11.7 9.8 - 12.8 seconds    INR 1.0 0.9 - 1.1   Lipid Panel   Result Value Ref Range    Cholesterol 159 0 - 199 mg/dL    HDL-Cholesterol 40.6 mg/dL    Cholesterol/HDL Ratio 3.9     LDL Calculated 89 <=99 mg/dL    VLDL 29 0 - 40 mg/dL    Triglycerides 146 0 - 149 mg/dL    Non HDL Cholesterol 118 0 - 149 mg/dL   Basic Metabolic Panel   Result Value Ref Range    Glucose 105 (H) 74 - 99 mg/dL    Sodium 140 136 - 145 mmol/L    Potassium 3.6 3.5 - 5.3 mmol/L    Chloride 108 (H) 98 - 107 mmol/L    Bicarbonate 26 21 - 32 mmol/L    Anion Gap 10 10 - 20 mmol/L    Urea Nitrogen 14 6 - 23 mg/dL    Creatinine 0.63 0.50 - 1.05 mg/dL    eGFR >90 >60 mL/min/1.73m*2    Calcium 8.7 8.6 - 10.3 mg/dL   CBC and Auto Differential   Result Value Ref Range    WBC 8.7 4.4 - 11.3 x10*3/uL    nRBC 0.0 0.0 - 0.0 /100 WBCs    RBC 3.94 (L) 4.00 - 5.20 x10*6/uL    Hemoglobin 10.7 (L) 12.0 - 16.0 g/dL    Hematocrit 35.2 (L) 36.0 - 46.0 %    MCV 89 80 - 100 fL    MCH 27.2 26.0 - 34.0 pg    MCHC 30.4 (L) 32.0 - 36.0 g/dL    RDW 14.0 11.5 - 14.5 %    Platelets 281 150 - 450 x10*3/uL    Neutrophils % 52.8 40.0 - 80.0 %    Immature Granulocytes %, Automated 0.3 0.0 - 0.9 %    Lymphocytes % 36.1 13.0 - 44.0 %    Monocytes % 8.4 2.0 - 10.0 %    Eosinophils % 1.9 0.0 - 6.0 %    Basophils % 0.5 0.0 - 2.0 %    Neutrophils Absolute 4.61 1.20 - 7.70 x10*3/uL    Immature Granulocytes Absolute, Automated 0.03 0.00 - 0.70 x10*3/uL    Lymphocytes Absolute 3.15 1.20 - 4.80 x10*3/uL    Monocytes Absolute 0.73 0.10 - 1.00 x10*3/uL    Eosinophils Absolute 0.17 0.00 - 0.70 x10*3/uL    Basophils Absolute 0.04 0.00 - 0.10 x10*3/uL   POCT  GLUCOSE   Result Value Ref Range    POCT Glucose 112 (H) 74 - 99 mg/dL   POCT GLUCOSE   Result Value Ref Range    POCT Glucose 121 (H) 74 - 99 mg/dL   POCT GLUCOSE   Result Value Ref Range    POCT Glucose 152 (H) 74 - 99 mg/dL   '       Assessment/Plan   Principal Problem:    Weakness generalized    59-year-old female with past medical history of obesity, CHF, SVT s/p ablation on Plavix, type 2 diabetes mellitus, diabetic neuropathy, hypertension, hyperlipidemia, GERD, constipation, CARISA on CPAP, asthma, osteoarthritis, depression, fibromyalgia, PMR, chronic pain, migraines, hard of hearing, ambulatory dysfunction 2/2 pain, and anemia.  Patient presents to the hospital following an acute onset of altered mental status and loss of consciousness with associated shaking and incontinence of bowel, reported to be lethargic post episode and also complaining of some left-sided deficits/paresthesias.  Presentation suspicious for possible seizure activity and CVA.    #Suspected seizure, No seizure Hx  #Strokelike symptoms  #Elevated lactate    Telemetry monitoring  Consult neurology, appreciate input  Will obtain MRI of the brain, MRA head and neck  Echocardiogram  Check EEG  Allow for permissive hypertension  PT/OT  Bedside swallow screen, SLP evaluation  NPO, advance diet as tolerated if patient passes bedside swallow screen  Social work/TCC consult for DC planning  Continue Plavix, will start aspirin.  Patient has no obvious infectious  precipitating  etiology, follow-up UA  Continue home statin, check lipid panel        #Abdominal tenderness  #Diarrhea x 1 episode with   #History of chronic constipation    Low suspicion for C. difficile infection or bacterial etiology, however patient has been on several courses of antibiotics recently, for this reason we will check stool studies for infection.  CT abdomen pelvis with IV contrast  Monitor  Further evaluation pending clinical course and imaging    Chronic  conditions:  #Congestive heart failure, unknown systolic versus diastolic, currently no exacerbation  #SVT s/p ablation  #CARISA, continue home CPAP  #Asthma, continue home nebulizers and respiratory treatments, oxygen as needed  #Type 2 diabetes mellitus, Lantus 20 units daily (substituted for Toujeo), sliding scale insulin  #Diabetic neuropathy  #Hypertension  # Hyperlipidemia  #GERD  #Osteoarthritis  #Depression  #Fibromyalgia  #History of PMR  #Chronic pain, follows with pain management at the [facility] currently on Mobic and Flexeril.  Avoiding opioid analgesics as able  #Migraines  #Hard of hearing  #Ambulatory dysfunction  #Anemia    Continue patient's home medications as appropriate    #DVT prophylaxis  SCDs  Lovenox subcutaneous    Madhu Barrera, DO

## 2024-04-22 NOTE — PROGRESS NOTES
"Liyah Márquez is a 59 y.o. female on day 0 of admission presenting with Weakness generalized.      Subjective   EEG unable to be completed earlier today due to patient's hair weave.  Patient's daughter has come to hospital and has successfully taken out artificial hair in order for testing to be completed tomorrow a.m.  Patient appropriate for discharge after completion of testing.    MRI of the brain/MRA of the head and neck all without contrast are negative for any acute findings in relation to CVA, hemorrhage, or flow-limiting stenosis.  MRI of the lumbar spine does note moderate area of left sided neural foraminal narrowing to the L4-L5 region.       Objective     Last Recorded Vitals  Blood pressure 137/67, pulse 73, temperature 35.8 °C (96.4 °F), resp. rate 19, height 1.803 m (5' 11\"), weight 136 kg (300 lb), SpO2 97%.  Physical exam/neurological exam  Patient seen and examined at this time; upon entering room she is resting quietly in bed. Appears fully developed and well nourished. Morbidly obese.  Mental status: A&Ox3. Memory testing, fund of knowledge and concentration WNL. Speech is fluent and negative for any paraphrasic errors.     Cranial Nerves:  Optic II/ Oculomotor III: Fundoscopic exam was technically difficult. PERRL +2. Visual fields are full. Convergence and accomodation noted without difficulty. Negative for deficits to visual acuity confrontation via static-finger wiggle test. Eyes appear aligned and free of exophthalmos and ptosis. Sclera are white bilaterally and lens are free from clouding.   Oculomotor III/ Trochlear IV/ Abducens VI: Extraocular movements are full, with no evidence of nystagmus. Negative for diplopia.   Trigeminal V: Facial sensation is intact to light touch. Corneal reflex responsive when threatened bilaterally.  Facial VII: intact; nose is midline, with no evidence of flattening to nasolabial folds noted and mouth is negative for evidence of droop. Patient is " successfully able to follow commands to raise eyebrows, squeeze eyes shut, smile and show teeth, frown, and puff out cheeks.   Acoustic VIII: Hearing is intact bilaterally.  Glossopharngyeal IX/ Vagus X: Palate elevates symmetrically to phonation. Findings are negative for uvula deviation or dysphagia.   Spinal accessory XI: Sternocleidomastoid/ upper trapezius is 5/5 to strength testing. No asymmetry noted to strength, bulk, or tone.   Hypoglossal XII: Tongue is midline and without deviations. Phonation is articulate and is negative for findings of dysarthria or aphasia.     Motor exam: negative for evidence of involuntary movements or fasiculations. BUE flexion of biceps and brachioradialis graded 5/5, in addition to extension of triceps at elbow and wrists. BUE  strength 5/5, along with finger abduction and thumb opposition. BLE hip flexion, extension, adduction, and abduction 5/5. Knee extension & flexion 5/5. Ankle dorsiflexion and plantarflexion 5/5. Normal bulk and normal tone.     Sensory exam: Sensation is intact to light touch throughout but is noted to be decreased to L side    Reflexes: Reflexes are 2+ and symmetric. Bilateral plantar responses are flexor. Ankle jerks symmetric.     Coordination: finger-to-nose testing is negative for signs of dysmetria. Pronator drift testing to BUE negative. Rapid alternating hand movements WNL.    Gait exam: negative for ataxia.    Relevant Results  Scheduled medications  acetaminophen, 975 mg, oral, q8h  aspirin, 81 mg, oral, Daily  enoxaparin, 40 mg, subcutaneous, q12h [facility]  fluticasone furoate-vilanteroL, 1 puff, inhalation, Every other day  insulin glargine, 20 Units, subcutaneous, q24h  insulin lispro, 0-10 Units, subcutaneous, TID with meals  [Held by provider] lisinopril, 40 mg, oral, Daily  metoprolol succinate XL, 50 mg, oral, Daily  rosuvastatin, 20 mg, oral, Nightly  [Held by provider] triamterene-hydrochlorothiazid, 1 tablet, oral, Daily      Continuous  medications     PRN medications  PRN medications: albuterol, cyclobenzaprine, dextrose, dextrose, diclofenac sodium, diphenhydrAMINE, glucagon, glucagon, hydrALAZINE **FOLLOWED BY** hydrALAZINE, labetaloL, lidocaine, LORazepam, meloxicam, polyethylene glycol  Results for orders placed or performed during the hospital encounter of 04/20/24 (from the past 24 hour(s))   POCT GLUCOSE   Result Value Ref Range    POCT Glucose 121 (H) 74 - 99 mg/dL   POCT GLUCOSE   Result Value Ref Range    POCT Glucose 152 (H) 74 - 99 mg/dL   POCT GLUCOSE   Result Value Ref Range    POCT Glucose 109 (H) 74 - 99 mg/dL   Transthoracic Echo (TTE) Complete   Result Value Ref Range    AV pk lidia 1.17 m/s    AV mn grad 3.0 mmHg    LVOT diam 2.30 cm    LV Biplane EF 60 %    MV E/A ratio 0.97     LA vol index A/L 22.8 ml/m2    Tricuspid annular plane systolic excursion 2.5 cm    RV free wall pk S' 15.40 cm/s    LVIDd 5.06 cm    Aortic Valve Area by Continuity of VTI 3.04 cm2    Aortic Valve Area by Continuity of Peak Velocity 2.83 cm2    AV pk grad 5.5 mmHg    LV A4C EF 56.8    CBC   Result Value Ref Range    WBC 7.0 4.4 - 11.3 x10*3/uL    nRBC 0.0 0.0 - 0.0 /100 WBCs    RBC 4.10 4.00 - 5.20 x10*6/uL    Hemoglobin 11.0 (L) 12.0 - 16.0 g/dL    Hematocrit 37.1 36.0 - 46.0 %    MCV 91 80 - 100 fL    MCH 26.8 26.0 - 34.0 pg    MCHC 29.6 (L) 32.0 - 36.0 g/dL    RDW 13.9 11.5 - 14.5 %    Platelets 270 150 - 450 x10*3/uL   Basic metabolic panel   Result Value Ref Range    Glucose 97 74 - 99 mg/dL    Sodium 140 136 - 145 mmol/L    Potassium 3.7 3.5 - 5.3 mmol/L    Chloride 107 98 - 107 mmol/L    Bicarbonate 27 21 - 32 mmol/L    Anion Gap 10 10 - 20 mmol/L    Urea Nitrogen 9 6 - 23 mg/dL    Creatinine 0.50 0.50 - 1.05 mg/dL    eGFR >90 >60 mL/min/1.73m*2    Calcium 8.6 8.6 - 10.3 mg/dL   POCT GLUCOSE   Result Value Ref Range    POCT Glucose 137 (H) 74 - 99 mg/dL     Transthoracic Echo (TTE) Complete    Result Date: 4/22/2024    [facility], [address]  [facility] [id number] [phone] and                                 Fax [phone] TRANSTHORACIC ECHOCARDIOGRAM REPORT  Patient Name:      KEISHA PATEL      Reading Physician:    [id number] Ace Zacarias MD Study Date:        4/22/2024           Ordering Provider:    [id number] SHAKILA WALL MRN/PID:           [id number]            Fellow: Accession#:        [id number]        Nurse:                Kylah Roman RN Date of Birth/Age: 1965 / 59 years Sonographer:          Lukas Camp ACS,                                                              RDCS, FASE Gender:            F                   Additional Staff: Height:            180.34 cm           Admit Date:           4/20/2024 Weight:            136.08 kg           Admission Status:     Observation -                                                              Priority discharge BSA / BMI:         2.51 m2 / 41.84     Encounter#:           [id number]                    kg/m2                                        Department Location:  [facility] Blood Pressure: 134 /70 mmHg Study Type:    TRANSTHORACIC ECHO (TTE) COMPLETE Diagnosis/ICD: 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 TIA, AMS, hx ablation, PSVT, CARISA. Study Detail: The following Echo studies were performed: 2D, M-Mode, Doppler and               color flow. Technically challenging study due to body habitus.               Agitated saline used as a contrast agent for intraseptal flow               evaluation.  PHYSICIAN INTERPRETATION: Left Ventricle: The left ventricular systolic function is normal, with an estimated ejection fraction of 60%. There are no regional wall motion  abnormalities. The left ventricular cavity size is normal. Spectral Doppler shows a normal pattern of left ventricular diastolic filling. Left Atrium: The left atrium is upper limits of normal in size. A bubble study using agitated saline was performed. Bubble study is negative. Right Ventricle: The right ventricle is normal in size. There is normal right ventricular global systolic function. Right Atrium: The right atrium is normal in size. Aortic Valve: The aortic valve is trileaflet. There is no evidence of aortic valve regurgitation. The peak instantaneous gradient of the aortic valve is 5.5 mmHg. The mean gradient of the aortic valve is 3.0 mmHg. Mitral Valve: The mitral valve is normal in structure. There is mild mitral valve regurgitation. Tricuspid Valve: The tricuspid valve is structurally normal. No evidence of tricuspid regurgitation. Pulmonic Valve: The pulmonic valve is structurally normal. There is no indication of pulmonic valve regurgitation. Pericardium: There is no pericardial effusion noted. Aorta: The aortic root is normal.  CONCLUSIONS:  1. Left ventricular systolic function is normal with a 60% estimated ejection fraction. QUANTITATIVE DATA SUMMARY: 2D MEASUREMENTS:                          Normal Ranges: Ao Root d:     3.40 cm   (2.0-3.7cm) LAs:           3.90 cm   (2.7-4.0cm) IVSd:          1.13 cm   (0.6-1.1cm) LVPWd:         1.09 cm   (0.6-1.1cm) LVIDd:         5.06 cm   (3.9-5.9cm) LVIDs:         3.19 cm LV Mass Index: 98.6 g/m2 LV % FS        36.9 % LA VOLUME:                               Normal Ranges: LA Vol A4C:        43.5 ml    (22+/-6mL/m2) LA Vol A2C:        66.9 ml LA Vol BP:         57.1 ml LA Vol Index A4C:  17.4ml/m2 LA Vol Index A2C:  26.7 ml/m2 LA Vol Index BP:   22.8 ml/m2 LA Area A4C:       16.0 cm2 LA Area A2C:       21.0 cm2 LA Major Axis A4C: 5.0 cm LA Major Axis A2C: 5.6 cm LA Volume Index:   21.0 ml/m2 RA VOLUME BY A/L METHOD:                       Normal Ranges: RA  Area A4C: 14.0 cm2 M-MODE MEASUREMENTS:                  Normal Ranges: Ao Root: 3.30 cm (2.0-3.7cm) LAs:     3.80 cm (2.7-4.0cm) AORTA MEASUREMENTS:                    Normal Ranges: Asc Ao, d: 3.00 cm (2.1-3.4cm) LV SYSTOLIC FUNCTION BY 2D PLANIMETRY (MOD):                     Normal Ranges: EF-A4C View: 56.8 % (>=55%) EF-A2C View: 63.3 % EF-Biplane:  59.9 % LV DIASTOLIC FUNCTION:                        Normal Ranges: MV Peak E:    0.78 m/s (0.7-1.2 m/s) MV Peak A:    0.80 m/s (0.42-0.7 m/s) E/A Ratio:    0.97     (1.0-2.2) MV lateral e' 0.09 m/s MV medial e'  0.08 m/s MITRAL VALVE:                 Normal Ranges: MV DT: 261 msec (150-240msec) AORTIC VALVE:                                   Normal Ranges: AoV Vmax:                1.17 m/s (<=1.7m/s) AoV Peak P.5 mmHg (<20mmHg) AoV Mean PG:             3.0 mmHg (1.7-11.5mmHg) LVOT Max Wei:            0.80 m/s (<=1.1m/s) AoV VTI:                 25.70 cm (18-25cm) LVOT VTI:                18.80 cm LVOT Diameter:           2.30 cm  (1.8-2.4cm) AoV Area, VTI:           3.04 cm2 (2.5-5.5cm2) AoV Area,Vmax:           2.83 cm2 (2.5-4.5cm2) AoV Dimensionless Index: 0.73  RIGHT VENTRICLE: RV Basal 3.63 cm TAPSE:   24.8 mm RV s'    0.15 m/s PULMONIC VALVE:                      Normal Ranges: PV Max Wei: 0.9 m/s  (0.6-0.9m/s) PV Max PG:  3.5 mmHg  [id number] Ace Zacarias MD Electronically signed on 2024 at 10:00:43 AM  ** Final **     MR lumbar spine wo IV contrast    Result Date: 2024  Interpreted By:  Ryne Cruz and O'Connor Gregory STUDY: MR LUMBAR SPINE WO IV CONTRAST;  2024 8:32 am   INDICATION: Signs/Symptoms:back pain, L5 radiculopathy findings on exam.   COMPARISON: CT abdomen pelvis dated 2024   ACCESSION NUMBER(S): [id number]   ORDERING CLINICIAN: MORA HILTON   TECHNIQUE: Sagittal T1, T2, STIR, axial T1 and T2 weighted images of the lumbar spine were acquired.   FINDINGS: This report assumes 5 non-rib bearing lumbar vertebral  bodies. The lowest intervertebral disc will be labeled L5-S1.   Alignment: There is grade 1 anterolisthesis at L4-L5.   Vertebrae/Intervertebral Discs: The vertebral bodies demonstrate expected height. The marrow signal is within normal limits. Mild intervertebral disc height loss and desiccation from the L2-3 through L5-S1 levels. There are mild chronic degenerative endplate changes including osteophyte formation.   Conus: The lower thoracic cord appears unremarkable. The conus terminates at the level of the mid L2 vertebral body and appears unremarkable.   T12-L1:  There is no posterior disc contour abnormality. There is no spinal canal stenosis or neural foraminal narrowing. There is mild bilateral facet osteoarthropathy.   L1-2:  There is no posterior disc contour abnormality. There is no spinal canal stenosis or neural foraminal narrowing. There is no striking facet osteoarthropathy.   L2-3: There is a small diffuse disc bulge. There is no spinal canal stenosis. There is extension of disc into the right neural foramen causing very mild neural foraminal narrowing. There is no narrowing of the left neural foramen. There is mild bilateral facet osteoarthropathy.   L3-4: There is a small diffuse disc bulge. There is no spinal canal stenosis. There is a right foraminal disc protrusion causing mild-to-moderate neural foraminal narrowing. There is no narrowing of the left neural foramen. There is marked right and mild-to-moderate left facet osteoarthropathy.   L4-5: There is a small diffuse disc bulge. There is no spinal canal stenosis. There is a left foraminal disc protrusion which along with facet osteoarthropathy causes moderate neural foraminal narrowing. There is marked bilateral facet osteoarthropathy.   L5-S1: There is no striking posterior disc contour abnormality. There is no spinal canal stenosis or neural foraminal narrowing. There is moderate bilateral facet osteoarthropathy.   The prevertebral and  posterior paraspinous soft tissues are unremarkable.       Multilevel degenerative changes of the lumbar spine as detailed above without spinal canal stenosis at any level. There is neural foraminal narrowing at few levels as detailed above, most pronounced at L4-L5 where there is moderate neural foraminal narrowing on the left.   I personally reviewed the images/study and I agree with the findings as stated by resident physician Dr. Singh Martin.   MACRO: None   Signed by: Ryne Cruz 4/22/2024 9:24 AM Dictation workstation:   [id number]    MR brain wo IV contrast    Result Date: 4/22/2024  Interpreted By:  Ryne Cruz, STUDY: MR BRAIN WO IV CONTRAST; MR ANGIO HEAD WO IV CONTRAST; MR ANGIO NECK WO IV CONTRAST; ;  4/22/2024 8:32 am   INDICATION: Signs/Symptoms:Left-sided weakness and paresthesias, altered mental status, possible seizure.   COMPARISON: CT head from 04/20/2024.   ACCESSION NUMBER(S): [id number]; [id number]; [id number]   ORDERING CLINICIAN: SHAKILA WALL   TECHNIQUE: MRI of the brain was performed with the acquisition of axial diffusion-weighted, axial FLAIR, axial T2 gradient echo, axial T2 fat saturated, and coronal and sagittal T1 weighted sequences.   MRA of the head and neck was performed with the acquisition of axial 3D time-of-flight sequences. Segmented MIPs are provided.   FINDINGS: MRI brain:   There is no acute intracranial hemorrhage or infarct. There is no abnormal extra-axial fluid collection or mass effect. The ventricles, sulci, and basilar cisterns are normal in size, shape, and configuration. There is no striking signal abnormality located within the brain parenchyma. The cerebellar tonsils terminate at the level of the foramen magnum. There is mild mucosal thickening located within the paranasal sinuses. Mastoid air cells are clear.   MRA head:   Bilateral internal carotid arteries are normal in course and caliber. There is no narrowing of the visualized portions of the  bilateral anterior or middle cerebral arteries.   Bilateral intradural vertebral arteries and basilar artery and the visualized portions of the bilateral posterior cerebral arteries are normal in course and caliber.   There are no aneurysms.   MRA neck:   There is no narrowing of the visualized portions of the bilateral common carotid arteries, carotid bulbs, or internal carotid arteries or the visualized portions of the bilateral vertebral arteries. The partially visualized thyroid gland is enlarged.       Evaluation is somewhat degraded due to patient motion.   Unremarkable MRI of the brain and MRA of the head and neck.   This study was interpreted at [facility].   MACRO: None   Signed by: Ryne Cruz 4/22/2024 8:37 AM Dictation workstation:   [id number]    MR angio neck wo IV contrast    Result Date: 4/22/2024  Interpreted By:  Ryne Cruz, STUDY: MR BRAIN WO IV CONTRAST; MR ANGIO HEAD WO IV CONTRAST; MR ANGIO NECK WO IV CONTRAST; ;  4/22/2024 8:32 am   INDICATION: Signs/Symptoms:Left-sided weakness and paresthesias, altered mental status, possible seizure.   COMPARISON: CT head from 04/20/2024.   ACCESSION NUMBER(S): [id number]; [id number]; [id number]   ORDERING CLINICIAN: SHAKILA WALL   TECHNIQUE: MRI of the brain was performed with the acquisition of axial diffusion-weighted, axial FLAIR, axial T2 gradient echo, axial T2 fat saturated, and coronal and sagittal T1 weighted sequences.   MRA of the head and neck was performed with the acquisition of axial 3D time-of-flight sequences. Segmented MIPs are provided.   FINDINGS: MRI brain:   There is no acute intracranial hemorrhage or infarct. There is no abnormal extra-axial fluid collection or mass effect. The ventricles, sulci, and basilar cisterns are normal in size, shape, and configuration. There is no striking signal abnormality located within the brain parenchyma. The cerebellar tonsils terminate at the level of  the foramen magnum. There is mild mucosal thickening located within the paranasal sinuses. Mastoid air cells are clear.   MRA head:   Bilateral internal carotid arteries are normal in course and caliber. There is no narrowing of the visualized portions of the bilateral anterior or middle cerebral arteries.   Bilateral intradural vertebral arteries and basilar artery and the visualized portions of the bilateral posterior cerebral arteries are normal in course and caliber.   There are no aneurysms.   MRA neck:   There is no narrowing of the visualized portions of the bilateral common carotid arteries, carotid bulbs, or internal carotid arteries or the visualized portions of the bilateral vertebral arteries. The partially visualized thyroid gland is enlarged.       Evaluation is somewhat degraded due to patient motion.   Unremarkable MRI of the brain and MRA of the head and neck.   This study was interpreted at [facility].   MACRO: None   Signed by: Ryne Cruz 4/22/2024 8:37 AM Dictation workstation:   [id number]    MR angio head wo IV contrast    Result Date: 4/22/2024  Interpreted By:  Ryne Cruz, STUDY: MR BRAIN WO IV CONTRAST; MR ANGIO HEAD WO IV CONTRAST; MR ANGIO NECK WO IV CONTRAST; ;  4/22/2024 8:32 am   INDICATION: Signs/Symptoms:Left-sided weakness and paresthesias, altered mental status, possible seizure.   COMPARISON: CT head from 04/20/2024.   ACCESSION NUMBER(S): [id number]; [id number]; [id number]   ORDERING CLINICIAN: SHAKILA WALL   TECHNIQUE: MRI of the brain was performed with the acquisition of axial diffusion-weighted, axial FLAIR, axial T2 gradient echo, axial T2 fat saturated, and coronal and sagittal T1 weighted sequences.   MRA of the head and neck was performed with the acquisition of axial 3D time-of-flight sequences. Segmented MIPs are provided.   FINDINGS: MRI brain:   There is no acute intracranial hemorrhage or infarct. There is no abnormal  extra-axial fluid collection or mass effect. The ventricles, sulci, and basilar cisterns are normal in size, shape, and configuration. There is no striking signal abnormality located within the brain parenchyma. The cerebellar tonsils terminate at the level of the foramen magnum. There is mild mucosal thickening located within the paranasal sinuses. Mastoid air cells are clear.   MRA head:   Bilateral internal carotid arteries are normal in course and caliber. There is no narrowing of the visualized portions of the bilateral anterior or middle cerebral arteries.   Bilateral intradural vertebral arteries and basilar artery and the visualized portions of the bilateral posterior cerebral arteries are normal in course and caliber.   There are no aneurysms.   MRA neck:   There is no narrowing of the visualized portions of the bilateral common carotid arteries, carotid bulbs, or internal carotid arteries or the visualized portions of the bilateral vertebral arteries. The partially visualized thyroid gland is enlarged.       Evaluation is somewhat degraded due to patient motion.   Unremarkable MRI of the brain and MRA of the head and neck.   This study was interpreted at [facility].   MACRO: None   Signed by: Ryne Cruz 4/22/2024 8:37 AM Dictation workstation:   [id number]    CT abdomen pelvis w IV contrast    Result Date: 4/20/2024  Interpreted By:  Jose Luis Leal, STUDY: CT ABDOMEN PELVIS W IV CONTRAST;  4/20/2024 9:06 pm   INDICATION: Signs/Symptoms:abdominal pain, diarhrea.   COMPARISON: None.   ACCESSION NUMBER(S): [id number]   ORDERING CLINICIAN: SHAKILA WALL   TECHNIQUE: Contiguous axial images of the abdomen and pelvis were obtained after the intravenous administration of  contrast. Coronal and sagittal reformatted images were obtained from the axial images.   FINDINGS: There is limited evaluation of the lung bases. Bibasilar subsegmental atelectasis.     There is hepatic  steatosis. The gallbladder is present. No dilatation of the common bile duct.   The pancreas, spleen, and adrenal glands appear unremarkable.   Symmetric enhancement of the kidneys. There is contrast in the bilateral renal collecting systems limiting evaluation for calculus. No hydronephrosis.   There is thinning and diastasis of the midline rectus musculature with bulge of abdominal wall and fat containing hernia.   No evidence of bowel obstruction.   Urinary bladder is underdistended and not well evaluated.   No significant free abdominal or pelvic fluid.   Multilevel degenerative change of the lumbar spine.       No evidence of acute abnormality of the abdominal viscera.   No evidence of bowel obstruction.   Hepatic steatosis.     MACRO: None   Signed by: Jose Luis Leal 4/20/2024 9:45 PM Dictation workstation:   [id number]    XR chest 1 view    Result Date: 4/20/2024  Interpreted By:  Hao Martínez, STUDY: XR CHEST 1 VIEW;  4/20/2024 4:59 pm   INDICATION: Signs/Symptoms:generalized weakness.   COMPARISON: None.   ACCESSION NUMBER(S): [id number]   ORDERING CLINICIAN: ROYA HONG   FINDINGS: CARDIOMEDIASTINAL SILHOUETTE AND VASCULATURE:   Cardiac size:  Within normal limits. Aortic shadow:  Within normal limits.   Mediastinal contours: Within normal limits.   Pulmonary vasculature:  The central vasculature is unremarkable   LUNGS: Lungs are clear.   ABDOMEN AND OTHER FINDINGS: No remarkable upper abdominal findings.   BONES: No acute osseous changes.       1.  No active cardiopulmonary disease.  There has not been significant interval change from the prior exam.   Signed by: Hao Martínez 4/20/2024 5:25 PM Dictation workstation:   [id number]    CT head wo IV contrast    Result Date: 4/20/2024  Interpreted By:  Hao Martínez, STUDY: CT HEAD WO IV CONTRAST;  4/20/2024 4:56 pm   INDICATION: Signs/Symptoms:generalized weakness.   COMPARISON: 01/19/2016   ACCESSION NUMBER(S): [id number]   ORDERING CLINICIAN: ROYA  HARNED   TECHNIQUE: Sequential trans axial images were obtained  .   FINDINGS: INTRACRANIAL:   CORTICAL SULCI AND EXTRA-AXIAL SPACES:  Unremarkable.   VENTRICULAR SYSTEM:  Unremarkable without significant dilatation.   CEREBRAL PARENCHYMA:  Unremarkable without significant degenerative change.There is no evidence of definite subacute infarction, intracranial hemorrhage or mass.   EXTRACRANIAL: Visualized paranasal sinuses and mastoids are clear. The calvarium is intact.       Unremarkable exam. There has not been significant interval change from the prior exam.   Signed by: Hao Martínez 4/20/2024 5:24 PM Dictation workstation:   [id number]           Hartman Coma Scale  Best Eye Response: Spontaneous  Best Verbal Response: Oriented  Best Motor Response: Follows commands  Hartman Coma Scale Score: 15                             Assessment/Plan      Principal Problem:    Weakness generalized  -Patient to undergo EEG testing first thing tomorrow a.m. and then will be stable for discharge from neurologic standpoint.  Patient will be contacted with results of testing by writer of this note only if significant for underlying epilepsy and requiring initiation of AED's.  Continue seizure precautions while hospitalized.  It is recommended for patient to restrict all driving practices x 6 months post seizure activity per [place] law recommendations.  -Recommendations for needs during hospitalization and at discharge via PT and OT  -Continue promotion of lifestyle modifications, such as: Strict BP and glycemic control, dietary habit changes, incorporation of daily exercise regimen, adherence to all prescription/OTC medication schedules, attendance to all follow-up appointments, cessation from smoking if applicable, abstinence from alcohol and illicit drug use if applicable  -Patient to follow-up with PCP in 1 to 2 weeks postdischarge  -Patient to wear a 14-day continuous cardiac event monitor at discharge  -Patient to  follow-up with Dr. Opal Bobby in 2 to 3 months postdischarge if results of EEG are significant for underlying epilepsy    Total face-to-face time spent with patient today was approximately 30 minutes; more than 50% of my time was spent counseling patient on: preparation to see patient via chart review of resulted laboratory values, radiographic imaging, prescribed medications, and impairment of involved organ systems. Time also spent on medical examination, placing appropriate orders for testing/medications, communicating with other health care providers, counseling/educating the patient/family, and care coordination.    *This note was created using voice recognition transcription software. Despite proofreading, unintentional typographical errors may be present. Please contact the department of neurology with any questions or concerns.       Anna Lott, SANDEEP-CNP

## 2024-04-23 ENCOUNTER — APPOINTMENT (OUTPATIENT)
Dept: NEUROLOGY | Facility: HOSPITAL | Age: 59
End: 2024-04-23
Payer: COMMERCIAL

## 2024-04-23 ENCOUNTER — APPOINTMENT (OUTPATIENT)
Dept: CARDIOLOGY | Facility: HOSPITAL | Age: 59
End: 2024-04-23
Payer: COMMERCIAL

## 2024-04-23 VITALS
RESPIRATION RATE: 18 BRPM | WEIGHT: 293 LBS | SYSTOLIC BLOOD PRESSURE: 156 MMHG | BODY MASS INDEX: 41.02 KG/M2 | HEART RATE: 69 BPM | OXYGEN SATURATION: 100 % | HEIGHT: 71 IN | DIASTOLIC BLOOD PRESSURE: 74 MMHG | TEMPERATURE: 95.4 F

## 2024-04-23 LAB
BACTERIA UR CULT: ABNORMAL
GLUCOSE BLD MANUAL STRIP-MCNC: 106 MG/DL (ref 74–99)
GLUCOSE BLD MANUAL STRIP-MCNC: 126 MG/DL (ref 74–99)

## 2024-04-23 PROCEDURE — 95816 EEG AWAKE AND DROWSY: CPT

## 2024-04-23 PROCEDURE — 82947 ASSAY GLUCOSE BLOOD QUANT: CPT

## 2024-04-23 PROCEDURE — 96372 THER/PROPH/DIAG INJ SC/IM: CPT | Performed by: NURSE PRACTITIONER

## 2024-04-23 PROCEDURE — 2500000004 HC RX 250 GENERAL PHARMACY W/ HCPCS (ALT 636 FOR OP/ED): Performed by: NURSE PRACTITIONER

## 2024-04-23 PROCEDURE — G0378 HOSPITAL OBSERVATION PER HR: HCPCS

## 2024-04-23 PROCEDURE — 96365 THER/PROPH/DIAG IV INF INIT: CPT | Mod: 59

## 2024-04-23 PROCEDURE — 93272 ECG/REVIEW INTERPRET ONLY: CPT | Performed by: INTERNAL MEDICINE

## 2024-04-23 PROCEDURE — 95816 EEG AWAKE AND DROWSY: CPT | Performed by: PSYCHIATRY & NEUROLOGY

## 2024-04-23 PROCEDURE — 99232 SBSQ HOSP IP/OBS MODERATE 35: CPT | Performed by: NURSE PRACTITIONER

## 2024-04-23 PROCEDURE — 93270 REMOTE 30 DAY ECG REV/REPORT: CPT

## 2024-04-23 PROCEDURE — 97530 THERAPEUTIC ACTIVITIES: CPT | Mod: GP

## 2024-04-23 PROCEDURE — 2500000001 HC RX 250 WO HCPCS SELF ADMINISTERED DRUGS (ALT 637 FOR MEDICARE OP): Performed by: NURSE PRACTITIONER

## 2024-04-23 PROCEDURE — 99239 HOSP IP/OBS DSCHRG MGMT >30: CPT | Performed by: INTERNAL MEDICINE

## 2024-04-23 PROCEDURE — 94640 AIRWAY INHALATION TREATMENT: CPT

## 2024-04-23 PROCEDURE — 99233 SBSQ HOSP IP/OBS HIGH 50: CPT | Performed by: INTERNAL MEDICINE

## 2024-04-23 RX ORDER — CIPROFLOXACIN 500 MG/1
500 TABLET ORAL 2 TIMES DAILY
Qty: 6 TABLET | Refills: 0 | Status: SHIPPED | OUTPATIENT
Start: 2024-04-23 | End: 2024-04-26

## 2024-04-23 RX ORDER — FLUCONAZOLE 100 MG/1
150 TABLET ORAL ONCE
Status: COMPLETED | OUTPATIENT
Start: 2024-04-23 | End: 2024-04-23

## 2024-04-23 RX ORDER — CEFTRIAXONE 1 G/50ML
1 INJECTION, SOLUTION INTRAVENOUS EVERY 24 HOURS
Status: DISCONTINUED | OUTPATIENT
Start: 2024-04-23 | End: 2024-04-23 | Stop reason: HOSPADM

## 2024-04-23 RX ORDER — NYSTATIN 100000 [USP'U]/G
1 POWDER TOPICAL 2 TIMES DAILY
Qty: 60 G | Refills: 0 | Status: SHIPPED | OUTPATIENT
Start: 2024-04-23

## 2024-04-23 RX ORDER — ASPIRIN 81 MG/1
81 TABLET ORAL DAILY
Qty: 90 TABLET | Refills: 0 | Status: SHIPPED | OUTPATIENT
Start: 2024-04-24

## 2024-04-23 RX ADMIN — CEFTRIAXONE SODIUM 1 G: 1 INJECTION, SOLUTION INTRAVENOUS at 04:39

## 2024-04-23 RX ADMIN — METOPROLOL SUCCINATE 50 MG: 50 TABLET, EXTENDED RELEASE ORAL at 09:38

## 2024-04-23 RX ADMIN — FLUTICASONE FUROATE AND VILANTEROL TRIFENATATE 1 PUFF: 200; 25 POWDER RESPIRATORY (INHALATION) at 07:24

## 2024-04-23 RX ADMIN — ASPIRIN 81 MG: 81 TABLET, COATED ORAL at 09:38

## 2024-04-23 RX ADMIN — FLUCONAZOLE 150 MG: 100 TABLET ORAL at 04:38

## 2024-04-23 RX ADMIN — ACETAMINOPHEN 975 MG: 325 TABLET ORAL at 05:17

## 2024-04-23 RX ADMIN — NYSTATIN 1 APPLICATION: 100000 POWDER TOPICAL at 09:39

## 2024-04-23 RX ADMIN — ENOXAPARIN SODIUM 40 MG: 40 INJECTION SUBCUTANEOUS at 09:38

## 2024-04-23 ASSESSMENT — PAIN - FUNCTIONAL ASSESSMENT
PAIN_FUNCTIONAL_ASSESSMENT: 0-10

## 2024-04-23 ASSESSMENT — COGNITIVE AND FUNCTIONAL STATUS - GENERAL
STANDING UP FROM CHAIR USING ARMS: A LITTLE
MOBILITY SCORE: 17
MOVING FROM LYING ON BACK TO SITTING ON SIDE OF FLAT BED WITH BEDRAILS: A LITTLE
MOVING TO AND FROM BED TO CHAIR: A LITTLE
WALKING IN HOSPITAL ROOM: A LITTLE
CLIMB 3 TO 5 STEPS WITH RAILING: A LOT
TURNING FROM BACK TO SIDE WHILE IN FLAT BAD: A LITTLE

## 2024-04-23 ASSESSMENT — PAIN SCALES - GENERAL
PAINLEVEL_OUTOF10: 3
PAINLEVEL_OUTOF10: 0 - NO PAIN
PAINLEVEL_OUTOF10: 0 - NO PAIN

## 2024-04-23 NOTE — PROGRESS NOTES
Physical Therapy    Physical Therapy Treatment    Patient Name: Liyah Márquez  MRN: [id number]  Today's Date: 4/23/2024  Time Calculation  Start Time: 0943  Stop Time: 1002  Time Calculation (min): 19 min       Assessment/Plan         PT Plan  Treatment/Interventions: Bed mobility, Transfer training, Gait training, Balance training, Neuromuscular re-education, Strengthening, Endurance training, Therapeutic exercise, Therapeutic activity  PT Plan: Skilled PT  PT Frequency: 4 times per week  PT Discharge Recommendations: Moderate intensity level of continued care  PT - OK to Discharge: Yes      General Visit Information:   PT  Visit  PT Received On: 04/23/24  General  Prior to Session Communication: Bedside nurse (Bedside nurse cleared pt for therapy intervention.)  Patient Position Received: Up in chair    Subjective   Precautions:  Precautions  Medical Precautions: Fall precautions     Objective   Pain:  Pain Assessment  Pain Assessment: 0-10  Pain Score: 0 - No pain     Treatments:  Therapeutic Exercise  Therapeutic Exercise Performed:  (Pt performed seated BLE ther ex 1-2 x 10 reps each.  Ex performed to improve strength and mobiity.)    Ambulation/Gait Training  Ambulation/Gait Training Performed:  (Pt amb 20 ft with wheeled walker and CGA.  Pt does not ambulate at a functional speed.)    Transfers  Transfer:  (Sit to stand transfer training with min assist.)    Outcome Measures:  [facility] Basic Mobility  Turning from your back to your side while in a flat bed without using bedrails: A little  Moving from lying on your back to sitting on the side of a flat bed without using bedrails: A little  Moving to and from bed to chair (including a wheelchair): A little  Standing up from a chair using your arms (e.g. wheelchair or bedside chair): A little  To walk in hospital room: A little  Climbing 3-5 steps with railing: A lot  Basic Mobility - Total Score: 17    EDUCATION:  Education Comment:  (Pt educated on safe mobility  techniques, treatment intervention and goals of treatment.)    Encounter Problems       Encounter Problems (Active)       PT Problem       STG - Pt will amb 25' using WW with minAx1 (Progressing)       Start:  04/21/24    Expected End:  05/05/24            STG - Pt will maintain F+ dynamic standing balance to decrease risk of falls (Progressing)       Start:  04/21/24    Expected End:  05/05/24            STG - Pt will perform 2-3 sets of BLE therex x10 to maximize functional strength and independence  (Progressing)       Start:  04/21/24    Expected End:  05/05/24               PT Problem       PT Goal 1 (Progressing)       Start:  04/22/24    Expected End:  05/06/24       Indep bed mob          PT Goal 2 (Progressing)       Start:  04/22/24    Expected End:  05/06/24       Indep txs

## 2024-04-23 NOTE — DISCHARGE SUMMARY
Discharge Diagnosis  Weakness generalized    Issues Requiring Follow-Up  Syncope, EEG    Test Results Pending At Discharge  Pending Labs       Order Current Status    Extra Urine Gray Tube Collected (04/20/24 2141)    Urinalysis with Reflex Culture and Microscopic In process            Hospital Course   59-year-old female with past medical history of obesity, CHF, SVT s/p ablation on Plavix, type 2 diabetes mellitus, diabetic neuropathy, hypertension, hyperlipidemia, GERD, constipation, CARISA on CPAP, asthma, osteoarthritis, depression, fibromyalgia, PMR, chronic pain, migraines, hard of hearing, ambulatory dysfunction 2/2 pain, and anemia.  Patient presents to the hospital following an acute onset of altered mental status and loss of consciousness with associated shaking and incontinence of bowel, reported to be lethargic post episode and also complaining of some left-sided deficits/paresthesias.  Presentation suspicious for possible seizure activity and CVA.     #Suspected seizure, No seizure Hx  #Strokelike symptoms  #Elevated lactate     Telemetry monitoring  Consult neurology, appreciate input  Will obtain MRI of the brain, MRA head and neck  Echocardiogram  Check EEG  Allow for permissive hypertension  PT/OT  Bedside swallow screen, SLP evaluation  NPO, advance diet as tolerated if patient passes bedside swallow screen  Social work/TCC consult for DC planning  Continue Plavix, will start aspirin.  Patient has no obvious infectious  precipitating  etiology, follow-up UA  Continue home statin, check lipid panel           #Abdominal tenderness  #Diarrhea x 1 episode with   #History of chronic constipation     Low suspicion for C. difficile infection or bacterial etiology, however patient has been on several courses of antibiotics recently, for this reason we will check stool studies for infection.  CT abdomen pelvis with IV contrast  Monitor  Further evaluation pending clinical course and imaging     Chronic  conditions:  #Congestive heart failure, unknown systolic versus diastolic, currently no exacerbation  #SVT s/p ablation  #CARISA, continue home CPAP  #Asthma, continue home nebulizers and respiratory treatments, oxygen as needed  #Type 2 diabetes mellitus, Lantus 20 units daily (substituted for Toujeo), sliding scale insulin  #Diabetic neuropathy  #Hypertension  # Hyperlipidemia  #GERD  #Osteoarthritis  #Depression  #Fibromyalgia  #History of PMR  #Chronic pain, follows with pain management at the [facility] currently on Mobic and Flexeril.  Avoiding opioid analgesics as able  #Migraines  #Hard of hearing  #Ambulatory dysfunction  #Anemia     Continue patient's home medications as appropriate     #DVT prophylaxis  SCDs  Lovenox subcutaneous     4/22: MRI brain and MRA head and neck unremarkable.  MRI L-spine pending.  EEG pending.  Echocardiogram pending.  Await neurology and cardiology recommendations.     4/23: Echo with EF 60%.  MRI with osteoarthritis of the lumbar spine with mild foraminal stenosis at multiple levels.  Patient advised to see a neurosurgeon outpatient.  A 14-day cardiac event monitor was placed prior to discharge.  EEG performed but read pending at the time of discharge.  Neurology will call the patient with results.  Patient discharged home on p.o. antibiotics for presumed UTI.  Hold triamterene-hydrochlorothiazide until follow-up with PCP    Pertinent Physical Exam At Time of Discharge  Physical Exam  Constitutional:       Appearance: Normal appearance. She is obese.   HENT:      Head: Normocephalic and atraumatic.      Nose: Nose normal.      Mouth/Throat:      Mouth: Mucous membranes are moist.      Pharynx: Oropharynx is clear.   Eyes:      Extraocular Movements: Extraocular movements intact.      Pupils: Pupils are equal, round, and reactive to light.   Cardiovascular:      Rate and Rhythm: Normal rate and regular rhythm.   Pulmonary:      Effort: No respiratory distress.      Breath  sounds: Normal breath sounds. No wheezing, rhonchi or rales.   Abdominal:      General: Bowel sounds are normal. There is no distension.      Palpations: Abdomen is soft.      Tenderness: There is no abdominal tenderness. There is no guarding.   Musculoskeletal:      Right lower leg: No edema.      Left lower leg: No edema.   Skin:     General: Skin is warm and dry.   Neurological:      Mental Status: She is alert and oriented to person, place, and time. Mental status is at baseline.   Psychiatric:         Mood and Affect: Mood normal.         Behavior: Behavior normal.         Home Medications     Medication List      START taking these medications     aspirin 81 mg EC tablet; Take 1 tablet (81 mg) by mouth once daily.;   Start taking on: April 24, 2024   ciprofloxacin 500 mg tablet; Commonly known as: Cipro; Take 1 tablet   (500 mg) by mouth 2 times a day for 3 days.   nystatin 100,000 unit/gram powder; Commonly known as: Mycostatin; Apply   1 Application topically 2 times a day.     CONTINUE taking these medications     acetaminophen 650 mg ER tablet; Commonly known as: Tylenol 8 HOUR   albuterol 90 mcg/actuation inhaler   alpha lipoic acid 100 mg capsule   ASHWAGANDHA EXTRACT ORAL   Banophen 25 mg tablet; Generic drug: diphenhydrAMINE   cholecalciferol 50,000 unit capsule; Commonly known as: Vitamin D-3   cyclobenzaprine 10 mg tablet; Commonly known as: Flexeril   diclofenac sodium 1 % gel; Commonly known as: Voltaren   flaxseed oiL oil   fluticasone furoate-vilanteroL 200-25 mcg/dose inhaler; Commonly known   as: Breo Ellipta   Jardiance 25 mg; Generic drug: empagliflozin   lidocaine 4 % cream; Commonly known as: LMX   lisinopril 40 mg tablet   meloxicam 7.5 mg tablet; Commonly known as: Mobic   metoprolol succinate XL 50 mg 24 hr tablet; Commonly known as: Toprol-XL   Ozempic 1 mg/dose (4 mg/3 mL) pen injector; Generic drug: semaglutide   promethazine 25 mg tablet; Commonly known as: Phenergan   rosuvastatin  20 mg tablet; Commonly known as: Crestor   Toujeo Max U-300 SoloStar 300 unit/mL (3 mL) injection; Generic drug:   insulin glargine   Ubrelvy 100 mg tablet tablet; Generic drug: ubrogepant     STOP taking these medications     triamterene-hydrochlorothiazid 37.5-25 mg capsule; Commonly known as:   Dyazide       Outpatient Follow-Up  No future appointments.    Madhu Barrera, DO

## 2024-04-23 NOTE — PROGRESS NOTES
"Liyah Márquez is a 59 y.o. female on day 0 of admission presenting with Weakness generalized.      Subjective   EEG completed this a.m., with currently pending.  Patient is appropriate for discharge from neurologic standpoint prior to availability of test results and will be contacted by writer of this note only if significant for underlying epilepsy.  She is to restrict all driving practices x 6 months post seizure activity.       Objective     Last Recorded Vitals  Blood pressure 156/74, pulse 69, temperature 35.2 °C (95.4 °F), temperature source Temporal, resp. rate 18, height 1.803 m (5' 11\"), weight 136 kg (300 lb), SpO2 100%.    Physical exam/neurological exam  Patient seen and examined at this time; upon entering room she is resting quietly in bed. Appears fully developed and well nourished. Morbidly obese.  Mental status: A&Ox3. Memory testing, fund of knowledge and concentration WNL. Speech is fluent and negative for any paraphrasic errors.      Cranial Nerves:  Optic II/ Oculomotor III: Fundoscopic exam was technically difficult. PERRL +2. Visual fields are full. Convergence and accomodation noted without difficulty. Negative for deficits to visual acuity confrontation via static-finger wiggle test. Eyes appear aligned and free of exophthalmos and ptosis. Sclera are white bilaterally and lens are free from clouding.   Oculomotor III/ Trochlear IV/ Abducens VI: Extraocular movements are full, with no evidence of nystagmus. Negative for diplopia.   Trigeminal V: Facial sensation is intact to light touch. Corneal reflex responsive when threatened bilaterally.  Facial VII: intact; nose is midline, with no evidence of flattening to nasolabial folds noted and mouth is negative for evidence of droop. Patient is successfully able to follow commands to raise eyebrows, squeeze eyes shut, smile and show teeth, frown, and puff out cheeks.   Acoustic VIII: Hearing is intact bilaterally.  Glossopharngyeal IX/ Vagus X: " Palate elevates symmetrically to phonation. Findings are negative for uvula deviation or dysphagia.   Spinal accessory XI: Sternocleidomastoid/ upper trapezius is 5/5 to strength testing. No asymmetry noted to strength, bulk, or tone.   Hypoglossal XII: Tongue is midline and without deviations. Phonation is articulate and is negative for findings of dysarthria or aphasia.      Motor exam: negative for evidence of involuntary movements or fasiculations. BUE flexion of biceps and brachioradialis graded 5/5, in addition to extension of triceps at elbow and wrists. BUE  strength 5/5, along with finger abduction and thumb opposition. BLE hip flexion, extension, adduction, and abduction 5/5. Knee extension & flexion 5/5. Ankle dorsiflexion and plantarflexion 5/5. Normal bulk and normal tone.      Sensory exam: Sensation is intact to light touch throughout but is noted to be decreased to L side     Reflexes: Reflexes are 2+ and symmetric. Bilateral plantar responses are flexor. Ankle jerks symmetric.      Coordination: finger-to-nose testing is negative for signs of dysmetria. Pronator drift testing to BUE negative. Rapid alternating hand movements WNL.     Gait exam: negative for ataxia.    Relevant Results  Scheduled medications  acetaminophen, 975 mg, oral, q8h  aspirin, 81 mg, oral, Daily  cefTRIAXone, 1 g, intravenous, q24h  enoxaparin, 40 mg, subcutaneous, q12h ELANA  fluticasone furoate-vilanteroL, 1 puff, inhalation, Every other day  insulin glargine, 20 Units, subcutaneous, q24h  insulin lispro, 0-10 Units, subcutaneous, TID with meals  [Held by provider] lisinopril, 40 mg, oral, Daily  metoprolol succinate XL, 50 mg, oral, Daily  nystatin, 1 Application, Topical, BID  rosuvastatin, 20 mg, oral, Nightly  [Held by provider] triamterene-hydrochlorothiazid, 1 tablet, oral, Daily      Continuous medications     PRN medications  PRN medications: albuterol, cyclobenzaprine, dextrose, dextrose, diclofenac sodium,  diphenhydrAMINE, glucagon, glucagon, [] hydrALAZINE **FOLLOWED BY** hydrALAZINE, lidocaine, LORazepam, meloxicam, polyethylene glycol  Electrocardiogram, 12-lead PRN ACS symptoms    Result Date: 2024  Sinus rhythm Atrial premature complex    Transthoracic Echo (TTE) Complete    Result Date: 2024    [facility], [address] [facility] [address] [place] [address]Byq [phone] and                                 Fax [phone] TRANSTHORACIC ECHOCARDIOGRAM REPORT  Patient Name:      KEISHA PATEL      Reading Physician:    [id number] Ace Zacarias MD Study Date:        2024           Ordering Provider:    [id number] SHAKILA WALL MRN/PID:           [id number]            Fellow: Accession#:        [id number]        Nurse:                Kylah Roman RN Date of Birth/Age: 1965 / 59 years Sonographer:          Lukas GARCIA,                                                              ROX, DARCIE Gender:            F                   Additional Staff: Height:            180.34 cm           Admit Date:           2024 Weight:            136.08 kg           Admission Status:     Observation -                                                              Priority discharge BSA / BMI:         2.51 m2 / 41.84     Encounter#:           [id number]                    kg/m2                                        Department Location:  [facility] Blood Pressure: 134 /70 mmHg Study Type:    TRANSTHORACIC ECHO (TTE) COMPLETE Diagnosis/ICD: 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 TIA, AMS, hx ablation, PSVT, CARISA. Study Detail: The following Echo studies were performed: 2D, M-Mode, Doppler and                color flow. Technically challenging study due to body habitus.               Agitated saline used as a contrast agent for intraseptal flow               evaluation.  PHYSICIAN INTERPRETATION: Left Ventricle: The left ventricular systolic function is normal, with an estimated ejection fraction of 60%. There are no regional wall motion abnormalities. The left ventricular cavity size is normal. Spectral Doppler shows a normal pattern of left ventricular diastolic filling. Left Atrium: The left atrium is upper limits of normal in size. A bubble study using agitated saline was performed. Bubble study is negative. Right Ventricle: The right ventricle is normal in size. There is normal right ventricular global systolic function. Right Atrium: The right atrium is normal in size. Aortic Valve: The aortic valve is trileaflet. There is no evidence of aortic valve regurgitation. The peak instantaneous gradient of the aortic valve is 5.5 mmHg. The mean gradient of the aortic valve is 3.0 mmHg. Mitral Valve: The mitral valve is normal in structure. There is mild mitral valve regurgitation. Tricuspid Valve: The tricuspid valve is structurally normal. No evidence of tricuspid regurgitation. Pulmonic Valve: The pulmonic valve is structurally normal. There is no indication of pulmonic valve regurgitation. Pericardium: There is no pericardial effusion noted. Aorta: The aortic root is normal.  CONCLUSIONS:  1. Left ventricular systolic function is normal with a 60% estimated ejection fraction. QUANTITATIVE DATA SUMMARY: 2D MEASUREMENTS:                          Normal Ranges: Ao Root d:     3.40 cm   (2.0-3.7cm) LAs:           3.90 cm   (2.7-4.0cm) IVSd:          1.13 cm   (0.6-1.1cm) LVPWd:         1.09 cm   (0.6-1.1cm) LVIDd:         5.06 cm   (3.9-5.9cm) LVIDs:         3.19 cm LV Mass Index: 98.6 g/m2 LV % FS        36.9 % LA VOLUME:                               Normal Ranges: LA Vol A4C:        43.5 ml    (22+/-6mL/m2) LA Vol  A2C:        66.9 ml LA Vol BP:         57.1 ml LA Vol Index A4C:  17.4ml/m2 LA Vol Index A2C:  26.7 ml/m2 LA Vol Index BP:   22.8 ml/m2 LA Area A4C:       16.0 cm2 LA Area A2C:       21.0 cm2 LA Major Axis A4C: 5.0 cm LA Major Axis A2C: 5.6 cm LA Volume Index:   21.0 ml/m2 RA VOLUME BY A/L METHOD:                       Normal Ranges: RA Area A4C: 14.0 cm2 M-MODE MEASUREMENTS:                  Normal Ranges: Ao Root: 3.30 cm (2.0-3.7cm) LAs:     3.80 cm (2.7-4.0cm) AORTA MEASUREMENTS:                    Normal Ranges: Asc Ao, d: 3.00 cm (2.1-3.4cm) LV SYSTOLIC FUNCTION BY 2D PLANIMETRY (MOD):                     Normal Ranges: EF-A4C View: 56.8 % (>=55%) EF-A2C View: 63.3 % EF-Biplane:  59.9 % LV DIASTOLIC FUNCTION:                        Normal Ranges: MV Peak E:    0.78 m/s (0.7-1.2 m/s) MV Peak A:    0.80 m/s (0.42-0.7 m/s) E/A Ratio:    0.97     (1.0-2.2) MV lateral e' 0.09 m/s MV medial e'  0.08 m/s MITRAL VALVE:                 Normal Ranges: MV DT: 261 msec (150-240msec) AORTIC VALVE:                                   Normal Ranges: AoV Vmax:                1.17 m/s (<=1.7m/s) AoV Peak P.5 mmHg (<20mmHg) AoV Mean PG:             3.0 mmHg (1.7-11.5mmHg) LVOT Max Wei:            0.80 m/s (<=1.1m/s) AoV VTI:                 25.70 cm (18-25cm) LVOT VTI:                18.80 cm LVOT Diameter:           2.30 cm  (1.8-2.4cm) AoV Area, VTI:           3.04 cm2 (2.5-5.5cm2) AoV Area,Vmax:           2.83 cm2 (2.5-4.5cm2) AoV Dimensionless Index: 0.73  RIGHT VENTRICLE: RV Basal 3.63 cm TAPSE:   24.8 mm RV s'    0.15 m/s PULMONIC VALVE:                      Normal Ranges: PV Max Wei: 0.9 m/s  (0.6-0.9m/s) PV Max PG:  3.5 mmHg  [id number] Ace Zacarias MD Electronically signed on 2024 at 10:00:43 AM  ** Final **     MR lumbar spine wo IV contrast    Result Date: 2024  Interpreted By:  Ryen Cruz  and Veronica Jaramillo STUDY: MR LUMBAR SPINE WO IV CONTRAST;  2024 8:32 am   INDICATION:  Signs/Symptoms:back pain, L5 radiculopathy findings on exam.   COMPARISON: CT abdomen pelvis dated 04/20/2024   ACCESSION NUMBER(S): [id number]   ORDERING CLINICIAN: MORA HILTON   TECHNIQUE: Sagittal T1, T2, STIR, axial T1 and T2 weighted images of the lumbar spine were acquired.   FINDINGS: This report assumes 5 non-rib bearing lumbar vertebral bodies. The lowest intervertebral disc will be labeled L5-S1.   Alignment: There is grade 1 anterolisthesis at L4-L5.   Vertebrae/Intervertebral Discs: The vertebral bodies demonstrate expected height. The marrow signal is within normal limits. Mild intervertebral disc height loss and desiccation from the L2-3 through L5-S1 levels. There are mild chronic degenerative endplate changes including osteophyte formation.   Conus: The lower thoracic cord appears unremarkable. The conus terminates at the level of the mid L2 vertebral body and appears unremarkable.   T12-L1:  There is no posterior disc contour abnormality. There is no spinal canal stenosis or neural foraminal narrowing. There is mild bilateral facet osteoarthropathy.   L1-2:  There is no posterior disc contour abnormality. There is no spinal canal stenosis or neural foraminal narrowing. There is no striking facet osteoarthropathy.   L2-3: There is a small diffuse disc bulge. There is no spinal canal stenosis. There is extension of disc into the right neural foramen causing very mild neural foraminal narrowing. There is no narrowing of the left neural foramen. There is mild bilateral facet osteoarthropathy.   L3-4: There is a small diffuse disc bulge. There is no spinal canal stenosis. There is a right foraminal disc protrusion causing mild-to-moderate neural foraminal narrowing. There is no narrowing of the left neural foramen. There is marked right and mild-to-moderate left facet osteoarthropathy.   L4-5: There is a small diffuse disc bulge. There is no spinal canal stenosis. There is a left foraminal disc  protrusion which along with facet osteoarthropathy causes moderate neural foraminal narrowing. There is marked bilateral facet osteoarthropathy.   L5-S1: There is no striking posterior disc contour abnormality. There is no spinal canal stenosis or neural foraminal narrowing. There is moderate bilateral facet osteoarthropathy.   The prevertebral and posterior paraspinous soft tissues are unremarkable.       Multilevel degenerative changes of the lumbar spine as detailed above without spinal canal stenosis at any level. There is neural foraminal narrowing at few levels as detailed above, most pronounced at L4-L5 where there is moderate neural foraminal narrowing on the left.   I personally reviewed the images/study and I agree with the findings as stated by resident physician Dr. Singh Martin.   MACRO: None   Signed by: Ryne Cruz 4/22/2024 9:24 AM Dictation workstation:   [id number]    MR brain wo IV contrast    Result Date: 4/22/2024  Interpreted By:  Ryne Cruz, STUDY: MR BRAIN WO IV CONTRAST; MR ANGIO HEAD WO IV CONTRAST; MR ANGIO NECK WO IV CONTRAST; ;  4/22/2024 8:32 am   INDICATION: Signs/Symptoms:Left-sided weakness and paresthesias, altered mental status, possible seizure.   COMPARISON: CT head from 04/20/2024.   ACCESSION NUMBER(S): [id number]; [id number]; [id number]   ORDERING CLINICIAN: SHAKILA WALL   TECHNIQUE: MRI of the brain was performed with the acquisition of axial diffusion-weighted, axial FLAIR, axial T2 gradient echo, axial T2 fat saturated, and coronal and sagittal T1 weighted sequences.   MRA of the head and neck was performed with the acquisition of axial 3D time-of-flight sequences. Segmented MIPs are provided.   FINDINGS: MRI brain:   There is no acute intracranial hemorrhage or infarct. There is no abnormal extra-axial fluid collection or mass effect. The ventricles, sulci, and basilar cisterns are normal in size, shape, and configuration. There is no striking signal  abnormality located within the brain parenchyma. The cerebellar tonsils terminate at the level of the foramen magnum. There is mild mucosal thickening located within the paranasal sinuses. Mastoid air cells are clear.   MRA head:   Bilateral internal carotid arteries are normal in course and caliber. There is no narrowing of the visualized portions of the bilateral anterior or middle cerebral arteries.   Bilateral intradural vertebral arteries and basilar artery and the visualized portions of the bilateral posterior cerebral arteries are normal in course and caliber.   There are no aneurysms.   MRA neck:   There is no narrowing of the visualized portions of the bilateral common carotid arteries, carotid bulbs, or internal carotid arteries or the visualized portions of the bilateral vertebral arteries. The partially visualized thyroid gland is enlarged.       Evaluation is somewhat degraded due to patient motion.   Unremarkable MRI of the brain and MRA of the head and neck.   This study was interpreted at [facility].   MACRO: None   Signed by: Ryne Cruz 4/22/2024 8:37 AM Dictation workstation:   [id number]    MR angio neck wo IV contrast    Result Date: 4/22/2024  Interpreted By:  Ryne Cruz, STUDY: MR BRAIN WO IV CONTRAST; MR ANGIO HEAD WO IV CONTRAST; MR ANGIO NECK WO IV CONTRAST; ;  4/22/2024 8:32 am   INDICATION: Signs/Symptoms:Left-sided weakness and paresthesias, altered mental status, possible seizure.   COMPARISON: CT head from 04/20/2024.   ACCESSION NUMBER(S): [id number]; [id number]; [id number]   ORDERING CLINICIAN: SHAKILA WALL   TECHNIQUE: MRI of the brain was performed with the acquisition of axial diffusion-weighted, axial FLAIR, axial T2 gradient echo, axial T2 fat saturated, and coronal and sagittal T1 weighted sequences.   MRA of the head and neck was performed with the acquisition of axial 3D time-of-flight sequences. Segmented MIPs are provided.    FINDINGS: MRI brain:   There is no acute intracranial hemorrhage or infarct. There is no abnormal extra-axial fluid collection or mass effect. The ventricles, sulci, and basilar cisterns are normal in size, shape, and configuration. There is no striking signal abnormality located within the brain parenchyma. The cerebellar tonsils terminate at the level of the foramen magnum. There is mild mucosal thickening located within the paranasal sinuses. Mastoid air cells are clear.   MRA head:   Bilateral internal carotid arteries are normal in course and caliber. There is no narrowing of the visualized portions of the bilateral anterior or middle cerebral arteries.   Bilateral intradural vertebral arteries and basilar artery and the visualized portions of the bilateral posterior cerebral arteries are normal in course and caliber.   There are no aneurysms.   MRA neck:   There is no narrowing of the visualized portions of the bilateral common carotid arteries, carotid bulbs, or internal carotid arteries or the visualized portions of the bilateral vertebral arteries. The partially visualized thyroid gland is enlarged.       Evaluation is somewhat degraded due to patient motion.   Unremarkable MRI of the brain and MRA of the head and neck.   This study was interpreted at [facility].   MACRO: None   Signed by: Ryne Cruz 4/22/2024 8:37 AM Dictation workstation:   [id number]    MR angio head wo IV contrast    Result Date: 4/22/2024  Interpreted By:  Ryne Cruz, STUDY: MR BRAIN WO IV CONTRAST; MR ANGIO HEAD WO IV CONTRAST; MR ANGIO NECK WO IV CONTRAST; ;  4/22/2024 8:32 am   INDICATION: Signs/Symptoms:Left-sided weakness and paresthesias, altered mental status, possible seizure.   COMPARISON: CT head from 04/20/2024.   ACCESSION NUMBER(S): [id number]; [id number]; [id number]   ORDERING CLINICIAN: SHAKILA WALL   TECHNIQUE: MRI of the brain was performed with the acquisition of axial  diffusion-weighted, axial FLAIR, axial T2 gradient echo, axial T2 fat saturated, and coronal and sagittal T1 weighted sequences.   MRA of the head and neck was performed with the acquisition of axial 3D time-of-flight sequences. Segmented MIPs are provided.   FINDINGS: MRI brain:   There is no acute intracranial hemorrhage or infarct. There is no abnormal extra-axial fluid collection or mass effect. The ventricles, sulci, and basilar cisterns are normal in size, shape, and configuration. There is no striking signal abnormality located within the brain parenchyma. The cerebellar tonsils terminate at the level of the foramen magnum. There is mild mucosal thickening located within the paranasal sinuses. Mastoid air cells are clear.   MRA head:   Bilateral internal carotid arteries are normal in course and caliber. There is no narrowing of the visualized portions of the bilateral anterior or middle cerebral arteries.   Bilateral intradural vertebral arteries and basilar artery and the visualized portions of the bilateral posterior cerebral arteries are normal in course and caliber.   There are no aneurysms.   MRA neck:   There is no narrowing of the visualized portions of the bilateral common carotid arteries, carotid bulbs, or internal carotid arteries or the visualized portions of the bilateral vertebral arteries. The partially visualized thyroid gland is enlarged.       Evaluation is somewhat degraded due to patient motion.   Unremarkable MRI of the brain and MRA of the head and neck.   This study was interpreted at [facility].   MACRO: None   Signed by: Ryne Cruz 4/22/2024 8:37 AM Dictation workstation:   [id number]    CT abdomen pelvis w IV contrast    Result Date: 4/20/2024  Interpreted By:  Jose Luis Leal, STUDY: CT ABDOMEN PELVIS W IV CONTRAST;  4/20/2024 9:06 pm   INDICATION: Signs/Symptoms:abdominal pain, diarhrea.   COMPARISON: None.   ACCESSION NUMBER(S): [id number]    ORDERING CLINICIAN: SHAKILA WALL   TECHNIQUE: Contiguous axial images of the abdomen and pelvis were obtained after the intravenous administration of  contrast. Coronal and sagittal reformatted images were obtained from the axial images.   FINDINGS: There is limited evaluation of the lung bases. Bibasilar subsegmental atelectasis.     There is hepatic steatosis. The gallbladder is present. No dilatation of the common bile duct.   The pancreas, spleen, and adrenal glands appear unremarkable.   Symmetric enhancement of the kidneys. There is contrast in the bilateral renal collecting systems limiting evaluation for calculus. No hydronephrosis.   There is thinning and diastasis of the midline rectus musculature with bulge of abdominal wall and fat containing hernia.   No evidence of bowel obstruction.   Urinary bladder is underdistended and not well evaluated.   No significant free abdominal or pelvic fluid.   Multilevel degenerative change of the lumbar spine.       No evidence of acute abnormality of the abdominal viscera.   No evidence of bowel obstruction.   Hepatic steatosis.     MACRO: None   Signed by: Jose Luis Leal 4/20/2024 9:45 PM Dictation workstation:   [id number]    XR chest 1 view    Result Date: 4/20/2024  Interpreted By:  Hao Martínez, STUDY: XR CHEST 1 VIEW;  4/20/2024 4:59 pm   INDICATION: Signs/Symptoms:generalized weakness.   COMPARISON: None.   ACCESSION NUMBER(S): [id number]   ORDERING CLINICIAN: ROYA HONG   FINDINGS: CARDIOMEDIASTINAL SILHOUETTE AND VASCULATURE:   Cardiac size:  Within normal limits. Aortic shadow:  Within normal limits.   Mediastinal contours: Within normal limits.   Pulmonary vasculature:  The central vasculature is unremarkable   LUNGS: Lungs are clear.   ABDOMEN AND OTHER FINDINGS: No remarkable upper abdominal findings.   BONES: No acute osseous changes.       1.  No active cardiopulmonary disease.  There has not been significant interval change from the prior exam.    Signed by: Hao Martínez 4/20/2024 5:25 PM Dictation workstation:   [id number]    CT head wo IV contrast    Result Date: 4/20/2024  Interpreted By:  Hao Martínez, STUDY: CT HEAD WO IV CONTRAST;  4/20/2024 4:56 pm   INDICATION: Signs/Symptoms:generalized weakness.   COMPARISON: 01/19/2016   ACCESSION NUMBER(S): [id number]   ORDERING CLINICIAN: ROYA HONG   TECHNIQUE: Sequential trans axial images were obtained  .   FINDINGS: INTRACRANIAL:   CORTICAL SULCI AND EXTRA-AXIAL SPACES:  Unremarkable.   VENTRICULAR SYSTEM:  Unremarkable without significant dilatation.   CEREBRAL PARENCHYMA:  Unremarkable without significant degenerative change.There is no evidence of definite subacute infarction, intracranial hemorrhage or mass.   EXTRACRANIAL: Visualized paranasal sinuses and mastoids are clear. The calvarium is intact.       Unremarkable exam. There has not been significant interval change from the prior exam.   Signed by: Hao Martínez 4/20/2024 5:24 PM Dictation workstation:   [id number]   Results for orders placed or performed during the hospital encounter of 04/20/24 (from the past 24 hour(s))   POCT GLUCOSE   Result Value Ref Range    POCT Glucose 125 (H) 74 - 99 mg/dL   Electrocardiogram, 12-lead PRN ACS symptoms   Result Value Ref Range    Ventricular Rate 83 BPM    Atrial Rate 85 BPM    [place] Interval 158 ms    QRS Duration 91 ms    QT Interval 359 ms    QTC Calculation(Bazett) 422 ms    P Axis 53 degrees    R Axis -2 degrees    T Axis 28 degrees    QRS Count 13 beats    Q Onset 253 ms    T Offset 432 ms    QTC Fredericia 400 ms   Troponin I, High Sensitivity   Result Value Ref Range    Troponin I, High Sensitivity 4 0 - 13 ng/L   POCT GLUCOSE   Result Value Ref Range    POCT Glucose 125 (H) 74 - 99 mg/dL   POCT GLUCOSE   Result Value Ref Range    POCT Glucose 100 (H) 74 - 99 mg/dL   POCT GLUCOSE   Result Value Ref Range    POCT Glucose 106 (H) 74 - 99 mg/dL   POCT GLUCOSE   Result Value Ref Range    POCT Glucose  126 (H) 74 - 99 mg/dL   Holter Or Event Cardiac Monitor   Result Value Ref Range    BSA 2.61 m2     No EEG results found for the past 12 months                [place] Coma Scale  Best Eye Response: Spontaneous  Best Verbal Response: Oriented  Best Motor Response: Follows commands  Thierry Coma Scale Score: 15                             Assessment/Plan      Principal Problem:    Weakness generalized  -Patient will be contacted with results of testing by writer of this note only if significant for underlying epilepsy and requiring initiation of AED's.  Continue seizure precautions while hospitalized.  It is recommended for patient to restrict all driving practices x 6 months post seizure activity per [place] IP law recommendations.  -Recommendations for needs during hospitalization and at discharge via PT and OT  -Continue promotion of lifestyle modifications, such as: Strict BP and glycemic control, dietary habit changes, incorporation of daily exercise regimen, adherence to all prescription/OTC medication schedules, attendance to all follow-up appointments, cessation from smoking if applicable, abstinence from alcohol and illicit drug use if applicable  -Patient to follow-up with PCP in 1 to 2 weeks postdischarge  -Patient to wear a 14-day continuous cardiac event monitor at discharge  -Patient to follow-up with Dr. Opal Bobby in 2 to 3 months postdischarge if results of EEG are significant for underlying epilepsy     Total face-to-face time spent with patient today was approximately 30 minutes; more than 50% of my time was spent counseling patient on: preparation to see patient via chart review of resulted laboratory values, radiographic imaging, prescribed medications, and impairment of involved organ systems. Time also spent on medical examination, placing appropriate orders for testing/medications, communicating with other health care providers, counseling/educating the patient/family, and care  coordination.    Neurology to sign off at this time. Thank you for the opportunity to be a part of this patient's multidisciplinary treatment team.  If any additional questions or concerns arise, please do not hesitate to contact me or the on-call neurologist via [employer] [facility].     *This note was created using voice recognition transcription software. Despite proofreading, unintentional typographical errors may be present. Please contact the department of neurology with any questions or concerns.         Anna Lott, SANDEEP-CNP

## 2024-04-23 NOTE — CARE PLAN
The patient's goals for the shift include pt will sleep 4 hours during the night    The clinical goals for the shift include Patient will remain free from pain or injury for entire shift.    Over the shift, the patient did not make progress toward the following goals. Barriers to progression include acute illness. Recommendations to address these barriers include communication.     - - -

## 2024-04-23 NOTE — CARE PLAN
The patient's goals for the shift include not having any more chest pain or pressure.    The clinical goals for the shift include Patient will remain safe and free from injury for entire shift.

## 2024-04-23 NOTE — PROGRESS NOTES
Cardiology Progress Note      Liyah Márquez is a 59 y.o. female on day 0 of admission presenting with Weakness generalized.      Subjective   Feeling fine.  Events noted from yesterday.  1 episode of chest pain.  She still has some chest tightness today.  Easily reproduced with palpation of her chest wall.  Clinically not anginal.  Biomarkers normal EKG unremarkable.       ROS:  10 systems reviewed other than what is mentioned above.     MEDICATION:    Scheduled medications  acetaminophen, 975 mg, oral, q8h  aspirin, 81 mg, oral, Daily  cefTRIAXone, 1 g, intravenous, q24h  enoxaparin, 40 mg, subcutaneous, q12h ELANA  fluticasone furoate-vilanteroL, 1 puff, inhalation, Every other day  insulin glargine, 20 Units, subcutaneous, q24h  insulin lispro, 0-10 Units, subcutaneous, TID with meals  [Held by provider] lisinopril, 40 mg, oral, Daily  metoprolol succinate XL, 50 mg, oral, Daily  nystatin, 1 Application, Topical, BID  rosuvastatin, 20 mg, oral, Nightly  [Held by provider] triamterene-hydrochlorothiazid, 1 tablet, oral, Daily      Continuous medications     PRN medications  PRN medications: albuterol, cyclobenzaprine, dextrose, dextrose, diclofenac sodium, diphenhydrAMINE, glucagon, glucagon, [] hydrALAZINE **FOLLOWED BY** hydrALAZINE, lidocaine, LORazepam, meloxicam, polyethylene glycol     Principal Problem:    Weakness generalized      OBJECTIVE:    Visit Vitals  /57 (Patient Position: Sitting)   Pulse 64   Temp 36.1 °C (97 °F) (Temporal)   Resp 20        No intake or output data in the 24 hours ending 24 0813             Patient is alert and oriented x3.  HEENT is unremarkable mucous members are moist  Neck no JVP no bruits upstrokes are full no thyromegaly  Lungs are clear bilaterally.  No wheezing crackles or rales  Heart regular rhythm normal S1-S2 there is no S3 no murmurs are heard.  Reproducible chest wall tenderness abdomen is soft vessels are positive nontender nondistended no  organomegaly no pulsatile masses  Extremities have no edema.  Distal pulses present palpable.  Neuro is grossly nonfocal  Skin has no rashes     Image Results  Transthoracic Echo (TTE) Complete      [facility], [address] [id number] [phone] and                                  Fax [phone]    TRANSTHORACIC ECHOCARDIOGRAM REPORT       Patient Name:      KEISHA PATEL      Reading Physician:    [id number] Ace Zacarias MD  Study Date:        4/22/2024           Ordering Provider:    [id number] SHAKILA WALL  MRN/PID:           [id number]            Fellow:  Accession#:        [id number]        Nurse:                Kylah Roman RN  Date of Birth/Age: 1965 / 59 years Sonographer:          Lukas Camp ACS,                                                               ROX, DARCIE  Gender:            F                   Additional Staff:  Height:            180.34 cm           Admit Date:           4/20/2024  Weight:            136.08 kg           Admission Status:     Observation -                                                               Priority discharge  BSA / BMI:         2.51 m2 / 41.84     Encounter#:           [id number]                     kg/m2                                         Department Location:  [facility]  Blood Pressure: 134 /70 mmHg    Study Type:    TRANSTHORACIC ECHO (TTE) COMPLETE  Diagnosis/ICD: Other transient cerebral ischemic attacks and related                 syndromes-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 TIA, AMS, hx ablation, PSVT, CARISA.    Study Detail: The following Echo studies were performed: 2D, M-Mode, Doppler and                color flow. Technically challenging study due to body habitus.                 Agitated saline used as a contrast agent for intraseptal flow                evaluation.       PHYSICIAN INTERPRETATION:  Left Ventricle: The left ventricular systolic function is normal, with an estimated ejection fraction of 60%. There are no regional wall motion abnormalities. The left ventricular cavity size is normal. Spectral Doppler shows a normal pattern of left ventricular diastolic filling.  Left Atrium: The left atrium is upper limits of normal in size. A bubble study using agitated saline was performed. Bubble study is negative.  Right Ventricle: The right ventricle is normal in size. There is normal right ventricular global systolic function.  Right Atrium: The right atrium is normal in size.  Aortic Valve: The aortic valve is trileaflet. There is no evidence of aortic valve regurgitation. The peak instantaneous gradient of the aortic valve is 5.5 mmHg. The mean gradient of the aortic valve is 3.0 mmHg.  Mitral Valve: The mitral valve is normal in structure. There is mild mitral valve regurgitation.  Tricuspid Valve: The tricuspid valve is structurally normal. No evidence of tricuspid regurgitation.  Pulmonic Valve: The pulmonic valve is structurally normal. There is no indication of pulmonic valve regurgitation.  Pericardium: There is no pericardial effusion noted.  Aorta: The aortic root is normal.       CONCLUSIONS:   1. Left ventricular systolic function is normal with a 60% estimated ejection fraction.    QUANTITATIVE DATA SUMMARY:  2D MEASUREMENTS:                           Normal Ranges:  Ao Root d:     3.40 cm   (2.0-3.7cm)  LAs:           3.90 cm   (2.7-4.0cm)  IVSd:          1.13 cm   (0.6-1.1cm)  LVPWd:         1.09 cm   (0.6-1.1cm)  LVIDd:         5.06 cm   (3.9-5.9cm)  LVIDs:         3.19 cm  LV Mass Index: 98.6 g/m2  LV % FS        36.9 %    LA VOLUME:                                Normal Ranges:  LA Vol A4C:        43.5 ml    (22+/-6mL/m2)  LA Vol A2C:        66.9 ml  LA Vol BP:          57.1 ml  LA Vol Index A4C:  17.4ml/m2  LA Vol Index A2C:  26.7 ml/m2  LA Vol Index BP:   22.8 ml/m2  LA Area A4C:       16.0 cm2  LA Area A2C:       21.0 cm2  LA Major Axis A4C: 5.0 cm  LA Major Axis A2C: 5.6 cm  LA Volume Index:   21.0 ml/m2    RA VOLUME BY A/L METHOD:                        Normal Ranges:  RA Area A4C: 14.0 cm2    M-MODE MEASUREMENTS:                   Normal Ranges:  Ao Root: 3.30 cm (2.0-3.7cm)  LAs:     3.80 cm (2.7-4.0cm)    AORTA MEASUREMENTS:                     Normal Ranges:  Asc Ao, d: 3.00 cm (2.1-3.4cm)    LV SYSTOLIC FUNCTION BY 2D PLANIMETRY (MOD):                      Normal Ranges:  EF-A4C View: 56.8 % (>=55%)  EF-A2C View: 63.3 %  EF-Biplane:  59.9 %    LV DIASTOLIC FUNCTION:                         Normal Ranges:  MV Peak E:    0.78 m/s (0.7-1.2 m/s)  MV Peak A:    0.80 m/s (0.42-0.7 m/s)  E/A Ratio:    0.97     (1.0-2.2)  MV lateral e' 0.09 m/s  MV medial e'  0.08 m/s    MITRAL VALVE:                  Normal Ranges:  MV DT: 261 msec (150-240msec)    AORTIC VALVE:                                    Normal Ranges:  AoV Vmax:                1.17 m/s (<=1.7m/s)  AoV Peak P.5 mmHg (<20mmHg)  AoV Mean PG:             3.0 mmHg (1.7-11.5mmHg)  LVOT Max Wei:            0.80 m/s (<=1.1m/s)  AoV VTI:                 25.70 cm (18-25cm)  LVOT VTI:                18.80 cm  LVOT Diameter:           2.30 cm  (1.8-2.4cm)  AoV Area, VTI:           3.04 cm2 (2.5-5.5cm2)  AoV Area,Vmax:           2.83 cm2 (2.5-4.5cm2)  AoV Dimensionless Index: 0.73       RIGHT VENTRICLE:  RV Basal 3.63 cm  TAPSE:   24.8 mm  RV s'    0.15 m/s    PULMONIC VALVE:                       Normal Ranges:  PV Max Wei: 0.9 m/s  (0.6-0.9m/s)  PV Max PG:  3.5 mmHg       [id number] Ace Zacarias MD  Electronically signed on 2024 at 10:00:43 AM       ** Final **  MR lumbar spine wo IV contrast  Narrative: Interpreted By:  Ryne Cruz,  and Veronica Jaramillo   STUDY:  MR LUMBAR SPINE WO IV CONTRAST;  2024 8:32  am      INDICATION:  Signs/Symptoms:back pain, L5 radiculopathy findings on exam.      COMPARISON:  CT abdomen pelvis dated 04/20/2024      ACCESSION NUMBER(S):  [id number]      ORDERING CLINICIAN:  MORA HILTON      TECHNIQUE:  Sagittal T1, T2, STIR, axial T1 and T2 weighted images of the lumbar  spine were acquired.      FINDINGS:  This report assumes 5 non-rib bearing lumbar vertebral bodies. The  lowest intervertebral disc will be labeled L5-S1.      Alignment: There is grade 1 anterolisthesis at L4-L5.      Vertebrae/Intervertebral Discs: The vertebral bodies demonstrate  expected height. The marrow signal is within normal limits. Mild  intervertebral disc height loss and desiccation from the L2-3 through  L5-S1 levels. There are mild chronic degenerative endplate changes  including osteophyte formation.      Conus: The lower thoracic cord appears unremarkable. The conus  terminates at the level of the mid L2 vertebral body and appears  unremarkable.      T12-L1:  There is no posterior disc contour abnormality. There is no  spinal canal stenosis or neural foraminal narrowing. There is mild  bilateral facet osteoarthropathy.      L1-2:  There is no posterior disc contour abnormality. There is no  spinal canal stenosis or neural foraminal narrowing. There is no  striking facet osteoarthropathy.      L2-3: There is a small diffuse disc bulge. There is no spinal canal  stenosis. There is extension of disc into the right neural foramen  causing very mild neural foraminal narrowing. There is no narrowing  of the left neural foramen. There is mild bilateral facet  osteoarthropathy.      L3-4: There is a small diffuse disc bulge. There is no spinal canal  stenosis. There is a right foraminal disc protrusion causing  mild-to-moderate neural foraminal narrowing. There is no narrowing of  the left neural foramen. There is marked right and mild-to-moderate  left facet osteoarthropathy.      L4-5: There is a small  diffuse disc bulge. There is no spinal canal  stenosis. There is a left foraminal disc protrusion which along with  facet osteoarthropathy causes moderate neural foraminal narrowing.  There is marked bilateral facet osteoarthropathy.      L5-S1: There is no striking posterior disc contour abnormality. There  is no spinal canal stenosis or neural foraminal narrowing. There is  moderate bilateral facet osteoarthropathy.      The prevertebral and posterior paraspinous soft tissues are  unremarkable.      Impression: Multilevel degenerative changes of the lumbar spine as detailed above  without spinal canal stenosis at any level. There is neural foraminal  narrowing at few levels as detailed above, most pronounced at L4-L5  where there is moderate neural foraminal narrowing on the left.      I personally reviewed the images/study and I agree with the findings  as stated by resident physician Dr. Singh Martin.      MACRO:  None      Signed by: Ryne Cruz 4/22/2024 9:24 AM  Dictation workstation:   [id number]  MR brain wo IV contrast, MR angio neck wo IV contrast, MR angio head wo IV contrast  Narrative: Interpreted By:  Ryne Cruz,   STUDY:  MR BRAIN WO IV CONTRAST; MR ANGIO HEAD WO IV CONTRAST; MR ANGIO NECK  WO IV CONTRAST; ;  4/22/2024 8:32 am      INDICATION:  Signs/Symptoms:Left-sided weakness and paresthesias, altered mental  status, possible seizure.      COMPARISON:  CT head from 04/20/2024.      ACCESSION NUMBER(S):  [id number]; [id number]; [id number]      ORDERING CLINICIAN:  SHAKILA WALL      TECHNIQUE:  MRI of the brain was performed with the acquisition of axial  diffusion-weighted, axial FLAIR, axial T2 gradient echo, axial T2 fat  saturated, and coronal and sagittal T1 weighted sequences.      MRA of the head and neck was performed with the acquisition of axial  3D time-of-flight sequences. Segmented MIPs are provided.      FINDINGS:  MRI brain:      There is no acute intracranial hemorrhage  or infarct. There is no  abnormal extra-axial fluid collection or mass effect. The ventricles,  sulci, and basilar cisterns are normal in size, shape, and  configuration. There is no striking signal abnormality located within  the brain parenchyma. The cerebellar tonsils terminate at the level  of the foramen magnum. There is mild mucosal thickening located  within the paranasal sinuses. Mastoid air cells are clear.      MRA head:      Bilateral internal carotid arteries are normal in course and caliber.  There is no narrowing of the visualized portions of the bilateral  anterior or middle cerebral arteries.      Bilateral intradural vertebral arteries and basilar artery and the  visualized portions of the bilateral posterior cerebral arteries are  normal in course and caliber.      There are no aneurysms.      MRA neck:      There is no narrowing of the visualized portions of the bilateral  common carotid arteries, carotid bulbs, or internal carotid arteries  or the visualized portions of the bilateral vertebral arteries. The  partially visualized thyroid gland is enlarged.      Impression: Evaluation is somewhat degraded due to patient motion.      Unremarkable MRI of the brain and MRA of the head and neck.      This study was interpreted at [facility].      MACRO:  None      Signed by: Ryne Cruz 4/22/2024 8:37 AM  Dictation workstation:   [id number]        Relevant Results:  RESULTS:    Lab Results   Component Value Date    WBC 7.0 04/22/2024    HGB 11.0 (L) 04/22/2024    HCT 37.1 04/22/2024    MCV 91 04/22/2024     04/22/2024        Lab Results   Component Value Date    CREATININE 0.50 04/22/2024    BUN 9 04/22/2024     04/22/2024    K 3.7 04/22/2024     04/22/2024    CO2 27 04/22/2024        Results from last 7 days   Lab Units 04/22/24  0934 04/21/24  0725 04/20/24  1635   PROTEIN TOTAL g/dL  --   --  8.0   BILIRUBIN TOTAL mg/dL  --   --  0.3   ALK  PHOS U/L  --   --  58   ALT U/L  --   --  14   AST U/L  --   --  15   GLUCOSE mg/dL 97 105* 151*   TRIGLYCERIDES mg/dL  --  146  --    HDL mg/dL  --  40.6  --    HEMOGLOBIN A1C %  --   --  5.8*   BNP pg/mL  --   --  15       Assessment/Plan      PMH  1.  PSVT status post ablation 2014 Dr. Alcazar  2.  Polymyositis  3.  Atypical chest pain with normal coronary catheterizations in the past in 2017  4.  Hypertension  5.  Obstructive sleep apnea  6.  Obesity  7.  Hyperlipidemia  8.  Hysterectomy BSO  9.  Right Achilles tendon repair     4/21/2024     1.  Possible syncope.  There was loss of bowel control.  Strong suspicion for neurologic seizure activity.  2D echo pending.  Will monitor on telemetry.  Remains in sinus rhythm.  No arrhythmias thus far.  2.  History of PSVT.  No recurrence at this time  3.  Diarrhea.  Defer to the primary service    4/23/2024    1.  Possible syncope.  Less likely.  Complete loss of bowel control.  She is getting a EEG today.  No arrhythmias on monitoring of telemetry.  Will place a 14-day event recorder.  Echo with normal LV function.    2.  History of PSVT.  No arrhythmias on telemetry.  Followed at the [facility] status post ablation with Dr. Alcazar 2022    3.  Chest pain.  Not anginal.  Biomarkers negative.  Chronic history of this.  Okay for discharge from a cardiac standpoint follow-up with her primary cardiologist at the [facility]    Ace Zacarias MD

## 2024-04-23 NOTE — PROGRESS NOTES
Occupational Therapy                 Therapy Communication Note    Patient Name: Liyah Márquez  MRN: [id number]  Today's Date: 4/23/2024     Discipline: Occupational Therapy    Missed Visit Reason: Missed Visit Reason:  (attendant in room for EEG)    Missed Time: Attempt 8:03AM

## 2024-04-24 LAB
ATRIAL RATE: 85 BPM
P AXIS: 53 DEGREES
PR INTERVAL: 158 MS
Q ONSET: 253 MS
QRS COUNT: 13 BEATS
QRS DURATION: 91 MS
QT INTERVAL: 359 MS
QTC CALCULATION(BAZETT): 422 MS
QTC FREDERICIA: 400 MS
R AXIS: -2 DEGREES
T AXIS: 28 DEGREES
T OFFSET: 432 MS
VENTRICULAR RATE: 83 BPM

## 2024-05-21 LAB — BODY SURFACE AREA: 2.61 M2
